# Patient Record
Sex: FEMALE | Race: WHITE | Employment: FULL TIME | URBAN - METROPOLITAN AREA
[De-identification: names, ages, dates, MRNs, and addresses within clinical notes are randomized per-mention and may not be internally consistent; named-entity substitution may affect disease eponyms.]

---

## 2018-01-15 ENCOUNTER — APPOINTMENT (OUTPATIENT)
Dept: CT IMAGING | Age: 38
End: 2018-01-15
Payer: COMMERCIAL

## 2018-01-15 ENCOUNTER — HOSPITAL ENCOUNTER (EMERGENCY)
Age: 38
Discharge: HOME | End: 2018-01-15
Payer: COMMERCIAL

## 2018-01-15 VITALS
TEMPERATURE: 97.8 F | HEART RATE: 74 BPM | BODY MASS INDEX: 32.96 KG/M2 | SYSTOLIC BLOOD PRESSURE: 124 MMHG | RESPIRATION RATE: 15 BRPM | OXYGEN SATURATION: 100 % | DIASTOLIC BLOOD PRESSURE: 78 MMHG | WEIGHT: 210 LBS | HEIGHT: 67 IN

## 2018-01-15 DIAGNOSIS — T81.31XA DEHISCENCE OF OPERATIVE WOUND, INITIAL ENCOUNTER: Primary | ICD-10-CM

## 2018-01-15 LAB
ALBUMIN SERPL-MCNC: 4.2 G/DL (ref 3.9–4.9)
ALP BLD-CCNC: 89 U/L (ref 40–130)
ALT SERPL-CCNC: 10 U/L (ref 0–33)
ANION GAP SERPL CALCULATED.3IONS-SCNC: 12 MEQ/L (ref 7–13)
ANISOCYTOSIS: 0
AST SERPL-CCNC: 12 U/L (ref 0–35)
BASOPHILS ABSOLUTE: 0 K/UL (ref 0–0.2)
BASOPHILS RELATIVE PERCENT: 1.1 %
BILIRUB SERPL-MCNC: 0.1 MG/DL (ref 0–1.2)
BILIRUBIN URINE: NEGATIVE
BLOOD, URINE: NEGATIVE
BUN BLDV-MCNC: 16 MG/DL (ref 6–20)
CALCIUM SERPL-MCNC: 9 MG/DL (ref 8.6–10.2)
CHLORIDE BLD-SCNC: 102 MEQ/L (ref 98–107)
CLARITY: CLEAR
CO2: 26 MEQ/L (ref 22–29)
COLOR: YELLOW
CREAT SERPL-MCNC: 0.94 MG/DL (ref 0.5–0.9)
EOSINOPHILS ABSOLUTE: 0.1 K/UL (ref 0–0.7)
EOSINOPHILS RELATIVE PERCENT: 1.7 %
GFR AFRICAN AMERICAN: >60
GFR NON-AFRICAN AMERICAN: >60
GLOBULIN: 2.8 G/DL (ref 2.3–3.5)
GLUCOSE BLD-MCNC: 98 MG/DL (ref 74–109)
GLUCOSE URINE: NEGATIVE MG/DL
HCT VFR BLD CALC: 38.5 % (ref 37–47)
HEMOGLOBIN: 12.7 G/DL (ref 12–16)
HYPOCHROMIA: 0
KETONES, URINE: NEGATIVE MG/DL
LEUKOCYTE ESTERASE, URINE: NEGATIVE
LYMPHOCYTES ABSOLUTE: 1.4 K/UL (ref 1–4.8)
LYMPHOCYTES RELATIVE PERCENT: 31.2 %
MACROCYTES: 0
MCH RBC QN AUTO: 26.9 PG (ref 27–31.3)
MCHC RBC AUTO-ENTMCNC: 33 % (ref 33–37)
MCV RBC AUTO: 81.6 FL (ref 82–100)
MICROCYTES: 0
MONOCYTES ABSOLUTE: 0.3 K/UL (ref 0.2–0.8)
MONOCYTES RELATIVE PERCENT: 7.7 %
NEUTROPHILS ABSOLUTE: 2.6 K/UL (ref 1.4–6.5)
NEUTROPHILS RELATIVE PERCENT: 58.3 %
NITRITE, URINE: NEGATIVE
PDW BLD-RTO: 13.9 % (ref 11.5–14.5)
PH UA: 6.5 (ref 5–9)
PLATELET # BLD: 190 K/UL (ref 130–400)
POC CREATININE WHOLE BLOOD: 1.2
POIKILOCYTES: 0
POLYCHROMASIA: 0
POTASSIUM SERPL-SCNC: 4.1 MEQ/L (ref 3.5–5.1)
PROTEIN UA: NEGATIVE MG/DL
RBC # BLD: 4.71 M/UL (ref 4.2–5.4)
SODIUM BLD-SCNC: 140 MEQ/L (ref 132–144)
SPECIFIC GRAVITY UA: 1.02 (ref 1–1.03)
TOTAL PROTEIN: 7 G/DL (ref 6.4–8.1)
URINE REFLEX TO CULTURE: NORMAL
UROBILINOGEN, URINE: 0.2 E.U./DL
WBC # BLD: 4.4 K/UL (ref 4.8–10.8)

## 2018-01-15 PROCEDURE — 80053 COMPREHEN METABOLIC PANEL: CPT

## 2018-01-15 PROCEDURE — 36415 COLL VENOUS BLD VENIPUNCTURE: CPT

## 2018-01-15 PROCEDURE — 99284 EMERGENCY DEPT VISIT MOD MDM: CPT

## 2018-01-15 PROCEDURE — 96372 THER/PROPH/DIAG INJ SC/IM: CPT

## 2018-01-15 PROCEDURE — 74176 CT ABD & PELVIS W/O CONTRAST: CPT

## 2018-01-15 PROCEDURE — 81003 URINALYSIS AUTO W/O SCOPE: CPT

## 2018-01-15 PROCEDURE — 85025 COMPLETE CBC W/AUTO DIFF WBC: CPT

## 2018-01-15 PROCEDURE — 6360000002 HC RX W HCPCS: Performed by: PERSONAL EMERGENCY RESPONSE ATTENDANT

## 2018-01-15 RX ORDER — KETOROLAC TROMETHAMINE 30 MG/ML
30 INJECTION, SOLUTION INTRAMUSCULAR; INTRAVENOUS ONCE
Status: DISCONTINUED | OUTPATIENT
Start: 2018-01-15 | End: 2018-01-15

## 2018-01-15 RX ORDER — AMOXICILLIN AND CLAVULANATE POTASSIUM 875; 125 MG/1; MG/1
1 TABLET, FILM COATED ORAL 2 TIMES DAILY
Qty: 14 TABLET | Refills: 0 | Status: SHIPPED | OUTPATIENT
Start: 2018-01-15 | End: 2018-01-22

## 2018-01-15 RX ORDER — KETOROLAC TROMETHAMINE 30 MG/ML
60 INJECTION, SOLUTION INTRAMUSCULAR; INTRAVENOUS ONCE
Status: COMPLETED | OUTPATIENT
Start: 2018-01-15 | End: 2018-01-15

## 2018-01-15 RX ADMIN — KETOROLAC TROMETHAMINE 60 MG: 30 INJECTION, SOLUTION INTRAMUSCULAR at 19:46

## 2018-01-15 ASSESSMENT — PAIN DESCRIPTION - DESCRIPTORS
DESCRIPTORS: PRESSURE
DESCRIPTORS: PRESSURE

## 2018-01-15 ASSESSMENT — ENCOUNTER SYMPTOMS
ABDOMINAL PAIN: 1
RHINORRHEA: 0
COUGH: 0
COLOR CHANGE: 0
SHORTNESS OF BREATH: 0
DIARRHEA: 0
VOMITING: 0
NAUSEA: 0
BLOOD IN STOOL: 0

## 2018-01-15 ASSESSMENT — PAIN DESCRIPTION - LOCATION
LOCATION: ABDOMEN
LOCATION: ABDOMEN

## 2018-01-15 ASSESSMENT — PAIN DESCRIPTION - ORIENTATION
ORIENTATION: LOWER
ORIENTATION: LOWER

## 2018-01-15 ASSESSMENT — PAIN DESCRIPTION - PAIN TYPE
TYPE: ACUTE PAIN
TYPE: ACUTE PAIN

## 2018-01-15 ASSESSMENT — PAIN SCALES - GENERAL
PAINLEVEL_OUTOF10: 2
PAINLEVEL_OUTOF10: 5
PAINLEVEL_OUTOF10: 5

## 2018-01-15 ASSESSMENT — PAIN DESCRIPTION - FREQUENCY
FREQUENCY: CONTINUOUS
FREQUENCY: CONTINUOUS

## 2018-01-15 ASSESSMENT — PAIN DESCRIPTION - PROGRESSION: CLINICAL_PROGRESSION: GRADUALLY IMPROVING

## 2018-01-15 NOTE — ED PROVIDER NOTES
abdomen reveals no acute process. Blood work is unremarkable. Urine shows no infection. Patient is laying comfortably in cart smiling in no apparent distress. Abdominal exam reveals a pinpoint area of wound dehiscence with no drainage. Patient placed on Augmentin. She was informed to wear belly band at home and follow up with OB. Patient appears nontoxic. Standard anticipatory guidance given to patient upon discharge. Have given them a specific time frame in which to follow-up and who to follow-up with. I have also advised them that they should return to the emergency department if they get worse, or not getting better or develop any new or concerning symptoms. Patient demonstrates understanding. CRITICAL CARE TIME   Total Critical Care time was 0 minutes, excluding separately reportable procedures. There was a high probability of clinically significant/life threatening deterioration in the patient's condition which required my urgent intervention. Procedures    FINAL IMPRESSION      1. Dehiscence of operative wound, initial encounter          DISPOSITION/PLAN   DISPOSITION Decision To Discharge 01/15/2018 08:59:02 PM      PATIENT REFERRED TO:  Follow up with your OB in 3 days            DISCHARGE MEDICATIONS:  New Prescriptions    AMOXICILLIN-CLAVULANATE (AUGMENTIN) 875-125 MG PER TABLET    Take 1 tablet by mouth 2 times daily for 7 days          (Please note that portions of this note were completed with a voice recognition program.  Efforts were made to edit the dictations but occasionally words are mis-transcribed. )    DEION Booker (electronically signed)  Emergency Physician [address] [place] [address]  01/15/18 [id number]

## 2018-01-16 LAB
GFR AFRICAN AMERICAN: >60
GFR NON-AFRICAN AMERICAN: 50
PERFORMED ON: ABNORMAL
POC CREATININE: 1.2 MG/DL (ref 0.6–1.1)
POC SAMPLE TYPE: ABNORMAL

## 2018-01-27 ENCOUNTER — APPOINTMENT (OUTPATIENT)
Dept: GENERAL RADIOLOGY | Age: 38
End: 2018-01-27
Payer: COMMERCIAL

## 2018-01-27 ENCOUNTER — HOSPITAL ENCOUNTER (EMERGENCY)
Age: 38
Discharge: HOME | End: 2018-01-27
Payer: COMMERCIAL

## 2018-01-27 VITALS
TEMPERATURE: 98 F | HEART RATE: 78 BPM | RESPIRATION RATE: 16 BRPM | BODY MASS INDEX: 32.33 KG/M2 | DIASTOLIC BLOOD PRESSURE: 74 MMHG | HEIGHT: 67 IN | SYSTOLIC BLOOD PRESSURE: 108 MMHG | WEIGHT: 206 LBS

## 2018-01-27 DIAGNOSIS — S33.5XXA LUMBAR SPRAIN, INITIAL ENCOUNTER: ICD-10-CM

## 2018-01-27 DIAGNOSIS — S92.901A CLOSED FRACTURE OF RIGHT FOOT, INITIAL ENCOUNTER: Primary | ICD-10-CM

## 2018-01-27 DIAGNOSIS — S93.401A SPRAIN OF RIGHT ANKLE, UNSPECIFIED LIGAMENT, INITIAL ENCOUNTER: ICD-10-CM

## 2018-01-27 PROCEDURE — 29515 APPLICATION SHORT LEG SPLINT: CPT

## 2018-01-27 PROCEDURE — 73630 X-RAY EXAM OF FOOT: CPT

## 2018-01-27 PROCEDURE — 72110 X-RAY EXAM L-2 SPINE 4/>VWS: CPT

## 2018-01-27 PROCEDURE — 99283 EMERGENCY DEPT VISIT LOW MDM: CPT

## 2018-01-27 PROCEDURE — 73610 X-RAY EXAM OF ANKLE: CPT

## 2018-01-27 PROCEDURE — 6370000000 HC RX 637 (ALT 250 FOR IP): Performed by: PHYSICIAN ASSISTANT

## 2018-01-27 RX ORDER — HYDROCODONE BITARTRATE AND ACETAMINOPHEN 5; 325 MG/1; MG/1
1 TABLET ORAL EVERY 6 HOURS PRN
Qty: 12 TABLET | Refills: 0 | Status: SHIPPED | OUTPATIENT
Start: 2018-01-27 | End: 2018-01-30

## 2018-01-27 RX ORDER — HYDROCODONE BITARTRATE AND ACETAMINOPHEN 5; 325 MG/1; MG/1
1 TABLET ORAL ONCE
Status: COMPLETED | OUTPATIENT
Start: 2018-01-27 | End: 2018-01-27

## 2018-01-27 RX ORDER — IBUPROFEN 800 MG/1
800 TABLET ORAL EVERY 8 HOURS PRN
Qty: 12 TABLET | Refills: 0 | Status: SHIPPED | OUTPATIENT
Start: 2018-01-27 | End: 2018-04-22

## 2018-01-27 RX ADMIN — HYDROCODONE BITARTRATE AND ACETAMINOPHEN 1 TABLET: 5; 325 TABLET ORAL at 11:19

## 2018-01-27 ASSESSMENT — PAIN DESCRIPTION - LOCATION: LOCATION: BACK;FOOT

## 2018-01-27 ASSESSMENT — ENCOUNTER SYMPTOMS
GASTROINTESTINAL NEGATIVE: 1
BACK PAIN: 1
RESPIRATORY NEGATIVE: 1
EYES NEGATIVE: 1

## 2018-01-27 ASSESSMENT — PAIN DESCRIPTION - PAIN TYPE: TYPE: ACUTE PAIN

## 2018-01-27 ASSESSMENT — PAIN DESCRIPTION - ORIENTATION: ORIENTATION: RIGHT;LOWER

## 2018-01-27 ASSESSMENT — PAIN SCALES - GENERAL
PAINLEVEL_OUTOF10: 6
PAINLEVEL_OUTOF10: 8

## 2018-01-27 NOTE — ED TRIAGE NOTES
Pt a/o x 3 skin pink w/d resp nonlabored. Pt states missed step and went down 4 steps. Pt c/o low back pain and rt top of foot hurting . Pt ambulates with limp. o/e small lump noted to top of foot w/o deformity.

## 2018-03-12 ENCOUNTER — APPOINTMENT (OUTPATIENT)
Dept: CT IMAGING | Age: 38
End: 2018-03-12

## 2018-03-12 ENCOUNTER — HOSPITAL ENCOUNTER (EMERGENCY)
Age: 38
Discharge: HOME | End: 2018-03-12

## 2018-03-12 ENCOUNTER — APPOINTMENT (OUTPATIENT)
Dept: GENERAL RADIOLOGY | Age: 38
End: 2018-03-12

## 2018-03-12 VITALS
WEIGHT: 216 LBS | HEART RATE: 61 BPM | RESPIRATION RATE: 16 BRPM | OXYGEN SATURATION: 100 % | HEIGHT: 67 IN | SYSTOLIC BLOOD PRESSURE: 115 MMHG | DIASTOLIC BLOOD PRESSURE: 72 MMHG | TEMPERATURE: 98.2 F | BODY MASS INDEX: 33.9 KG/M2

## 2018-03-12 DIAGNOSIS — R04.2 COUGH WITH HEMOPTYSIS: Primary | ICD-10-CM

## 2018-03-12 LAB
ALBUMIN SERPL-MCNC: 4.4 G/DL (ref 3.9–4.9)
ALP BLD-CCNC: 77 U/L (ref 40–130)
ALT SERPL-CCNC: 13 U/L (ref 0–33)
ANION GAP SERPL CALCULATED.3IONS-SCNC: 15 MEQ/L (ref 7–13)
APTT: 26.8 SEC (ref 21.6–35.4)
AST SERPL-CCNC: 15 U/L (ref 0–35)
BILIRUB SERPL-MCNC: 0.2 MG/DL (ref 0–1.2)
BUN BLDV-MCNC: 12 MG/DL (ref 6–20)
CALCIUM SERPL-MCNC: 9.3 MG/DL (ref 8.6–10.2)
CHLORIDE BLD-SCNC: 98 MEQ/L (ref 98–107)
CO2: 24 MEQ/L (ref 22–29)
CREAT SERPL-MCNC: 0.71 MG/DL (ref 0.5–0.9)
GFR AFRICAN AMERICAN: >60
GFR NON-AFRICAN AMERICAN: >60
GLOBULIN: 2.7 G/DL (ref 2.3–3.5)
GLUCOSE BLD-MCNC: 92 MG/DL (ref 74–109)
HCT VFR BLD CALC: 40.7 % (ref 37–47)
HEMOGLOBIN: 13 G/DL (ref 12–16)
INR BLD: 1
MCH RBC QN AUTO: 25.5 PG (ref 27–31.3)
MCHC RBC AUTO-ENTMCNC: 31.9 % (ref 33–37)
MCV RBC AUTO: 80 FL (ref 82–100)
PDW BLD-RTO: 16.1 % (ref 11.5–14.5)
PLATELET # BLD: 178 K/UL (ref 130–400)
POTASSIUM SERPL-SCNC: 3.8 MEQ/L (ref 3.5–5.1)
PROTHROMBIN TIME: 10.5 SEC (ref 8.1–13.7)
RBC # BLD: 5.08 M/UL (ref 4.2–5.4)
SODIUM BLD-SCNC: 137 MEQ/L (ref 132–144)
TOTAL PROTEIN: 7.1 G/DL (ref 6.4–8.1)
WBC # BLD: 4.3 K/UL (ref 4.8–10.8)

## 2018-03-12 PROCEDURE — 80053 COMPREHEN METABOLIC PANEL: CPT

## 2018-03-12 PROCEDURE — 99284 EMERGENCY DEPT VISIT MOD MDM: CPT

## 2018-03-12 PROCEDURE — 85027 COMPLETE CBC AUTOMATED: CPT

## 2018-03-12 PROCEDURE — 36415 COLL VENOUS BLD VENIPUNCTURE: CPT

## 2018-03-12 PROCEDURE — 2580000003 HC RX 258: Performed by: PHYSICIAN ASSISTANT

## 2018-03-12 PROCEDURE — 6360000004 HC RX CONTRAST MEDICATION: Performed by: PHYSICIAN ASSISTANT

## 2018-03-12 PROCEDURE — 71046 X-RAY EXAM CHEST 2 VIEWS: CPT

## 2018-03-12 PROCEDURE — 85730 THROMBOPLASTIN TIME PARTIAL: CPT

## 2018-03-12 PROCEDURE — 71275 CT ANGIOGRAPHY CHEST: CPT

## 2018-03-12 PROCEDURE — 85610 PROTHROMBIN TIME: CPT

## 2018-03-12 RX ORDER — PANTOPRAZOLE SODIUM 40 MG/1
40 GRANULE, DELAYED RELEASE ORAL
COMMUNITY
End: 2018-04-22

## 2018-03-12 RX ORDER — SODIUM CHLORIDE 0.9 % (FLUSH) 0.9 %
10 SYRINGE (ML) INJECTION ONCE
Status: COMPLETED | OUTPATIENT
Start: 2018-03-12 | End: 2018-03-12

## 2018-03-12 RX ORDER — TIZANIDINE 4 MG/1
4 TABLET ORAL EVERY 6 HOURS PRN
COMMUNITY
End: 2018-04-22

## 2018-03-12 RX ORDER — CITALOPRAM 40 MG/1
40 TABLET ORAL DAILY
COMMUNITY

## 2018-03-12 RX ORDER — AZITHROMYCIN 250 MG/1
TABLET, FILM COATED ORAL
Qty: 1 PACKET | Refills: 0 | Status: SHIPPED | OUTPATIENT
Start: 2018-03-12 | End: 2018-03-22

## 2018-03-12 RX ORDER — PROMETHAZINE HYDROCHLORIDE 25 MG/1
25 TABLET ORAL EVERY 6 HOURS PRN
COMMUNITY
End: 2018-04-22

## 2018-03-12 RX ORDER — LEVOTHYROXINE SODIUM 0.15 MG/1
150 TABLET ORAL DAILY
COMMUNITY

## 2018-03-12 RX ORDER — BENZONATATE 100 MG/1
100 CAPSULE ORAL 3 TIMES DAILY PRN
Qty: 21 CAPSULE | Refills: 0 | Status: SHIPPED | OUTPATIENT
Start: 2018-03-12 | End: 2018-04-22

## 2018-03-12 RX ORDER — BUTALBITAL, ACETAMINOPHEN AND CAFFEINE 50; 325; 40 MG/1; MG/1; MG/1
1 TABLET ORAL EVERY 4 HOURS PRN
COMMUNITY
End: 2018-04-07 | Stop reason: SDUPTHER

## 2018-03-12 RX ADMIN — SODIUM CHLORIDE, PRESERVATIVE FREE 10 ML: 5 INJECTION INTRAVENOUS at 13:02

## 2018-03-12 RX ADMIN — IOPAMIDOL 100 ML: 755 INJECTION, SOLUTION INTRAVENOUS at 13:01

## 2018-03-12 ASSESSMENT — ENCOUNTER SYMPTOMS
NAUSEA: 0
STRIDOR: 0
DIARRHEA: 0
ABDOMINAL DISTENTION: 0
ABDOMINAL PAIN: 0
SORE THROAT: 0
COUGH: 1
RHINORRHEA: 0
EYE DISCHARGE: 0
COLOR CHANGE: 0
SHORTNESS OF BREATH: 0
WHEEZING: 0
BACK PAIN: 0
CHOKING: 0
CONSTIPATION: 0
VOMITING: 0

## 2018-03-12 ASSESSMENT — PAIN SCALES - GENERAL
PAINLEVEL_OUTOF10: 2
PAINLEVEL_OUTOF10: 2

## 2018-03-12 ASSESSMENT — PAIN DESCRIPTION - LOCATION: LOCATION: ABDOMEN

## 2018-03-12 ASSESSMENT — PAIN DESCRIPTION - PAIN TYPE
TYPE: ACUTE PAIN
TYPE: ACUTE PAIN

## 2018-03-12 ASSESSMENT — PAIN DESCRIPTION - FREQUENCY
FREQUENCY: CONTINUOUS
FREQUENCY: CONTINUOUS

## 2018-03-12 ASSESSMENT — PAIN DESCRIPTION - DESCRIPTORS
DESCRIPTORS: SORE
DESCRIPTORS: DULL;ACHING

## 2018-03-12 NOTE — ED PROVIDER NOTES
which required my urgent intervention. CONSULTS:  None    PROCEDURES:  Unless otherwise noted below, none     Procedures    FINAL IMPRESSION      1. Cough with hemoptysis          DISPOSITION/PLAN   DISPOSITION Decision To Discharge 03/12/2018 02:06:46 PM      PATIENT REFERRED TO:  Arin Cheatham DO  [address] [facility], [place]  [address]  [phone]    In 3 days        DISCHARGE MEDICATIONS:  New Prescriptions    AZITHROMYCIN (ZITHROMAX) 250 MG TABLET    Take 2 tablets (500 mg) on Day 1, followed by 1 tablet (250 mg) once daily on Days 2 through 5.     BENZONATATE (TESSALON PERLES) 100 MG CAPSULE    Take 1 capsule by mouth 3 times daily as needed for Cough          (Please note that portions of this note were completed with a voice recognition program.  Efforts were made to edit the dictations but occasionally words are mis-transcribed.)    Mely Horn PA-C (electronically signed)  Attending Emergency Physician         Mely Horn PA-C  03/12/18 [id number]

## 2018-03-13 LAB
GFR AFRICAN AMERICAN: >60
GFR NON-AFRICAN AMERICAN: >60
PERFORMED ON: NORMAL
POC CREATININE: 0.6 MG/DL (ref 0.6–1.1)
POC SAMPLE TYPE: NORMAL

## 2018-04-06 ENCOUNTER — HOSPITAL ENCOUNTER (EMERGENCY)
Age: 38
Discharge: HOME | End: 2018-04-07
Payer: COMMERCIAL

## 2018-04-06 ENCOUNTER — APPOINTMENT (OUTPATIENT)
Dept: CT IMAGING | Age: 38
End: 2018-04-06

## 2018-04-06 DIAGNOSIS — J40 BRONCHITIS: Primary | ICD-10-CM

## 2018-04-06 DIAGNOSIS — R51.9 NONINTRACTABLE HEADACHE, UNSPECIFIED CHRONICITY PATTERN, UNSPECIFIED HEADACHE TYPE: ICD-10-CM

## 2018-04-06 LAB
EKG ATRIAL RATE: 67 BPM
EKG P AXIS: 29 DEGREES
EKG P-R INTERVAL: 130 MS
EKG Q-T INTERVAL: 436 MS
EKG QRS DURATION: 76 MS
EKG QTC CALCULATION (BAZETT): 460 MS
EKG R AXIS: -8 DEGREES
EKG T AXIS: 16 DEGREES
EKG VENTRICULAR RATE: 67 BPM

## 2018-04-06 PROCEDURE — 36415 COLL VENOUS BLD VENIPUNCTURE: CPT

## 2018-04-06 PROCEDURE — 85025 COMPLETE CBC W/AUTO DIFF WBC: CPT

## 2018-04-06 PROCEDURE — 6360000002 HC RX W HCPCS: Performed by: PHYSICIAN ASSISTANT

## 2018-04-06 PROCEDURE — 84484 ASSAY OF TROPONIN QUANT: CPT

## 2018-04-06 PROCEDURE — 80048 BASIC METABOLIC PNL TOTAL CA: CPT

## 2018-04-06 PROCEDURE — 93005 ELECTROCARDIOGRAM TRACING: CPT

## 2018-04-06 PROCEDURE — 70450 CT HEAD/BRAIN W/O DYE: CPT

## 2018-04-06 PROCEDURE — 99284 EMERGENCY DEPT VISIT MOD MDM: CPT

## 2018-04-06 RX ORDER — ALBUTEROL SULFATE 2.5 MG/3ML
2.5 SOLUTION RESPIRATORY (INHALATION) EVERY 10 MIN PRN
Status: DISCONTINUED | OUTPATIENT
Start: 2018-04-06 | End: 2018-04-07 | Stop reason: HOSPADM

## 2018-04-06 RX ADMIN — ALBUTEROL SULFATE 2.5 MG: 2.5 SOLUTION RESPIRATORY (INHALATION) at 23:58

## 2018-04-06 ASSESSMENT — PAIN DESCRIPTION - PAIN TYPE: TYPE: ACUTE PAIN

## 2018-04-06 ASSESSMENT — PAIN DESCRIPTION - LOCATION: LOCATION: CHEST;HEAD;THROAT

## 2018-04-06 ASSESSMENT — PAIN SCALES - GENERAL: PAINLEVEL_OUTOF10: 8

## 2018-04-07 ENCOUNTER — APPOINTMENT (OUTPATIENT)
Dept: GENERAL RADIOLOGY | Age: 38
End: 2018-04-07

## 2018-04-07 VITALS
HEIGHT: 67 IN | HEART RATE: 98 BPM | BODY MASS INDEX: 33.74 KG/M2 | DIASTOLIC BLOOD PRESSURE: 57 MMHG | SYSTOLIC BLOOD PRESSURE: 98 MMHG | OXYGEN SATURATION: 98 % | WEIGHT: 215 LBS | RESPIRATION RATE: 18 BRPM | TEMPERATURE: 98.7 F

## 2018-04-07 LAB
ANION GAP SERPL CALCULATED.3IONS-SCNC: 12 MEQ/L (ref 7–13)
BASOPHILS ABSOLUTE: 0 K/UL (ref 0–0.2)
BASOPHILS RELATIVE PERCENT: 0.9 %
BUN BLDV-MCNC: 16 MG/DL (ref 6–20)
CALCIUM SERPL-MCNC: 8.8 MG/DL (ref 8.6–10.2)
CHLORIDE BLD-SCNC: 103 MEQ/L (ref 98–107)
CO2: 26 MEQ/L (ref 22–29)
CREAT SERPL-MCNC: 0.63 MG/DL (ref 0.5–0.9)
EOSINOPHILS ABSOLUTE: 0.1 K/UL (ref 0–0.7)
EOSINOPHILS RELATIVE PERCENT: 2.1 %
GFR AFRICAN AMERICAN: >60
GFR NON-AFRICAN AMERICAN: >60
GLUCOSE BLD-MCNC: 111 MG/DL (ref 74–109)
HCT VFR BLD CALC: 39.5 % (ref 37–47)
HEMOGLOBIN: 13 G/DL (ref 12–16)
LYMPHOCYTES ABSOLUTE: 1.1 K/UL (ref 1–4.8)
LYMPHOCYTES RELATIVE PERCENT: 26.9 %
MCH RBC QN AUTO: 25.5 PG (ref 27–31.3)
MCHC RBC AUTO-ENTMCNC: 32.9 % (ref 33–37)
MCV RBC AUTO: 77.6 FL (ref 82–100)
MONOCYTES ABSOLUTE: 0.5 K/UL (ref 0.2–0.8)
MONOCYTES RELATIVE PERCENT: 11.5 %
NEUTROPHILS ABSOLUTE: 2.4 K/UL (ref 1.4–6.5)
NEUTROPHILS RELATIVE PERCENT: 58.6 %
PDW BLD-RTO: 16.6 % (ref 11.5–14.5)
PLATELET # BLD: 149 K/UL (ref 130–400)
POTASSIUM SERPL-SCNC: 4 MEQ/L (ref 3.5–5.1)
RAPID INFLUENZA  B AGN: NEGATIVE
RAPID INFLUENZA A AGN: NEGATIVE
RBC # BLD: 5.09 M/UL (ref 4.2–5.4)
SODIUM BLD-SCNC: 141 MEQ/L (ref 132–144)
TROPONIN: <0.01 NG/ML (ref 0–0.01)
WBC # BLD: 4.2 K/UL (ref 4.8–10.8)

## 2018-04-07 PROCEDURE — 6360000002 HC RX W HCPCS: Performed by: PHYSICIAN ASSISTANT

## 2018-04-07 PROCEDURE — 86403 PARTICLE AGGLUT ANTBDY SCRN: CPT

## 2018-04-07 PROCEDURE — 71046 X-RAY EXAM CHEST 2 VIEWS: CPT

## 2018-04-07 PROCEDURE — 96375 TX/PRO/DX INJ NEW DRUG ADDON: CPT

## 2018-04-07 PROCEDURE — 96374 THER/PROPH/DIAG INJ IV PUSH: CPT

## 2018-04-07 PROCEDURE — 94760 N-INVAS EAR/PLS OXIMETRY 1: CPT

## 2018-04-07 PROCEDURE — 94640 AIRWAY INHALATION TREATMENT: CPT

## 2018-04-07 PROCEDURE — 6370000000 HC RX 637 (ALT 250 FOR IP): Performed by: PHYSICIAN ASSISTANT

## 2018-04-07 RX ORDER — BUTALBITAL, ACETAMINOPHEN AND CAFFEINE 50; 325; 40 MG/1; MG/1; MG/1
1 TABLET ORAL EVERY 4 HOURS PRN
Qty: 15 TABLET | Refills: 0 | Status: SHIPPED | OUTPATIENT
Start: 2018-04-07

## 2018-04-07 RX ORDER — ALBUTEROL SULFATE 90 UG/1
AEROSOL, METERED RESPIRATORY (INHALATION)
Qty: 1 INHALER | Refills: 1 | Status: SHIPPED | OUTPATIENT
Start: 2018-04-07 | End: 2018-04-22

## 2018-04-07 RX ORDER — ACETAMINOPHEN 500 MG
1000 TABLET ORAL ONCE
Status: COMPLETED | OUTPATIENT
Start: 2018-04-07 | End: 2018-04-07

## 2018-04-07 RX ORDER — MORPHINE SULFATE 4 MG/ML
4 INJECTION, SOLUTION INTRAMUSCULAR; INTRAVENOUS ONCE
Status: COMPLETED | OUTPATIENT
Start: 2018-04-07 | End: 2018-04-07

## 2018-04-07 RX ORDER — ONDANSETRON 2 MG/ML
4 INJECTION INTRAMUSCULAR; INTRAVENOUS ONCE
Status: COMPLETED | OUTPATIENT
Start: 2018-04-07 | End: 2018-04-07

## 2018-04-07 RX ORDER — AZITHROMYCIN 250 MG/1
TABLET, FILM COATED ORAL
Qty: 6 TABLET | Refills: 0 | Status: SHIPPED | OUTPATIENT
Start: 2018-04-07 | End: 2018-04-17

## 2018-04-07 RX ADMIN — ALBUTEROL SULFATE 2.5 MG: 2.5 SOLUTION RESPIRATORY (INHALATION) at 00:03

## 2018-04-07 RX ADMIN — MORPHINE SULFATE 4 MG: 4 INJECTION, SOLUTION INTRAMUSCULAR; INTRAVENOUS at 01:27

## 2018-04-07 RX ADMIN — ONDANSETRON 4 MG: 2 INJECTION INTRAMUSCULAR; INTRAVENOUS at 01:27

## 2018-04-07 RX ADMIN — ACETAMINOPHEN 1000 MG: 500 TABLET, FILM COATED ORAL at 02:31

## 2018-04-07 ASSESSMENT — PAIN SCALES - GENERAL: PAINLEVEL_OUTOF10: 5

## 2018-04-07 ASSESSMENT — ENCOUNTER SYMPTOMS
VOICE CHANGE: 0
ANAL BLEEDING: 0
COUGH: 1
EYE DISCHARGE: 0
APNEA: 0
PHOTOPHOBIA: 0
ABDOMINAL DISTENTION: 0

## 2018-04-09 PROCEDURE — 93010 ELECTROCARDIOGRAM REPORT: CPT | Performed by: INTERNAL MEDICINE

## 2018-04-22 ENCOUNTER — HOSPITAL ENCOUNTER (EMERGENCY)
Age: 38
Discharge: HOME | End: 2018-04-22

## 2018-04-22 VITALS
SYSTOLIC BLOOD PRESSURE: 134 MMHG | RESPIRATION RATE: 20 BRPM | WEIGHT: 220 LBS | HEART RATE: 82 BPM | BODY MASS INDEX: 34.53 KG/M2 | HEIGHT: 67 IN | TEMPERATURE: 98.3 F | OXYGEN SATURATION: 99 % | DIASTOLIC BLOOD PRESSURE: 85 MMHG

## 2018-04-22 DIAGNOSIS — M54.41 ACUTE RIGHT-SIDED LOW BACK PAIN WITH RIGHT-SIDED SCIATICA: Primary | ICD-10-CM

## 2018-04-22 PROCEDURE — 6370000000 HC RX 637 (ALT 250 FOR IP): Performed by: PERSONAL EMERGENCY RESPONSE ATTENDANT

## 2018-04-22 PROCEDURE — 96372 THER/PROPH/DIAG INJ SC/IM: CPT

## 2018-04-22 PROCEDURE — 99283 EMERGENCY DEPT VISIT LOW MDM: CPT

## 2018-04-22 PROCEDURE — 6360000002 HC RX W HCPCS: Performed by: PERSONAL EMERGENCY RESPONSE ATTENDANT

## 2018-04-22 RX ORDER — CYCLOBENZAPRINE HCL 10 MG
10 TABLET ORAL ONCE
Status: COMPLETED | OUTPATIENT
Start: 2018-04-22 | End: 2018-04-22

## 2018-04-22 RX ORDER — METHYLPREDNISOLONE 4 MG/1
TABLET ORAL
Qty: 1 KIT | Refills: 0 | Status: SHIPPED | OUTPATIENT
Start: 2018-04-22 | End: 2018-06-14 | Stop reason: ALTCHOICE

## 2018-04-22 RX ORDER — ORPHENADRINE CITRATE 30 MG/ML
60 INJECTION INTRAMUSCULAR; INTRAVENOUS ONCE
Status: DISCONTINUED | OUTPATIENT
Start: 2018-04-22 | End: 2018-04-22

## 2018-04-22 RX ORDER — KETOROLAC TROMETHAMINE 30 MG/ML
60 INJECTION, SOLUTION INTRAMUSCULAR; INTRAVENOUS ONCE
Status: COMPLETED | OUTPATIENT
Start: 2018-04-22 | End: 2018-04-22

## 2018-04-22 RX ORDER — NAPROXEN 500 MG/1
500 TABLET ORAL 2 TIMES DAILY WITH MEALS
Qty: 30 TABLET | Refills: 0 | Status: SHIPPED | OUTPATIENT
Start: 2018-04-22 | End: 2018-06-14 | Stop reason: ALTCHOICE

## 2018-04-22 RX ORDER — CYCLOBENZAPRINE HCL 10 MG
10 TABLET ORAL 3 TIMES DAILY PRN
Qty: 6 TABLET | Refills: 0 | Status: SHIPPED | OUTPATIENT
Start: 2018-04-22 | End: 2018-05-02

## 2018-04-22 RX ORDER — HYDROCODONE BITARTRATE AND ACETAMINOPHEN 5; 325 MG/1; MG/1
1 TABLET ORAL ONCE
Status: COMPLETED | OUTPATIENT
Start: 2018-04-22 | End: 2018-04-22

## 2018-04-22 RX ADMIN — HYDROCODONE BITARTRATE AND ACETAMINOPHEN 1 TABLET: 5; 325 TABLET ORAL at 22:40

## 2018-04-22 RX ADMIN — KETOROLAC TROMETHAMINE 60 MG: 30 INJECTION, SOLUTION INTRAMUSCULAR at 22:40

## 2018-04-22 RX ADMIN — CYCLOBENZAPRINE HYDROCHLORIDE 10 MG: 10 TABLET, FILM COATED ORAL at 22:45

## 2018-04-22 ASSESSMENT — PAIN DESCRIPTION - ORIENTATION: ORIENTATION: MID

## 2018-04-22 ASSESSMENT — ENCOUNTER SYMPTOMS
COUGH: 0
NAUSEA: 0
VOMITING: 0
SHORTNESS OF BREATH: 0
BACK PAIN: 1
RHINORRHEA: 0
BLOOD IN STOOL: 0
COLOR CHANGE: 0
SORE THROAT: 0
DIARRHEA: 0
ABDOMINAL PAIN: 0

## 2018-04-22 ASSESSMENT — PAIN DESCRIPTION - DESCRIPTORS: DESCRIPTORS: CONSTANT;SHOOTING

## 2018-04-22 ASSESSMENT — PAIN DESCRIPTION - FREQUENCY: FREQUENCY: CONTINUOUS

## 2018-04-22 ASSESSMENT — PAIN SCALES - GENERAL
PAINLEVEL_OUTOF10: 9
PAINLEVEL_OUTOF10: 5

## 2018-04-22 ASSESSMENT — PAIN DESCRIPTION - PAIN TYPE: TYPE: ACUTE PAIN

## 2018-04-22 ASSESSMENT — PAIN DESCRIPTION - LOCATION: LOCATION: BACK

## 2018-05-17 ENCOUNTER — HOSPITAL ENCOUNTER (EMERGENCY)
Age: 38
Discharge: HOME | End: 2018-05-17
Attending: EMERGENCY MEDICINE

## 2018-05-17 VITALS
OXYGEN SATURATION: 99 % | WEIGHT: 220 LBS | HEIGHT: 67 IN | BODY MASS INDEX: 34.53 KG/M2 | TEMPERATURE: 98.1 F | DIASTOLIC BLOOD PRESSURE: 73 MMHG | HEART RATE: 90 BPM | SYSTOLIC BLOOD PRESSURE: 123 MMHG | RESPIRATION RATE: 18 BRPM

## 2018-05-17 DIAGNOSIS — J30.1 ACUTE SEASONAL ALLERGIC RHINITIS DUE TO POLLEN: ICD-10-CM

## 2018-05-17 DIAGNOSIS — J45.20 MILD INTERMITTENT REACTIVE AIRWAY DISEASE WITHOUT COMPLICATION: Primary | ICD-10-CM

## 2018-05-17 PROCEDURE — 99284 EMERGENCY DEPT VISIT MOD MDM: CPT

## 2018-05-17 RX ORDER — CEFUROXIME AXETIL 500 MG/1
500 TABLET ORAL 2 TIMES DAILY
Status: ON HOLD | COMMUNITY
End: 2018-11-12 | Stop reason: HOSPADM

## 2018-05-17 RX ORDER — FLUTICASONE PROPIONATE 110 UG/1
1 AEROSOL, METERED RESPIRATORY (INHALATION) 2 TIMES DAILY
Qty: 1 INHALER | Refills: 3 | Status: SHIPPED | OUTPATIENT
Start: 2018-05-17 | End: 2019-03-25 | Stop reason: ALTCHOICE

## 2018-05-17 ASSESSMENT — PAIN SCALES - GENERAL: PAINLEVEL_OUTOF10: 5

## 2018-05-17 ASSESSMENT — ENCOUNTER SYMPTOMS
ABDOMINAL PAIN: 0
NAUSEA: 0
SORE THROAT: 0
DIARRHEA: 1
BACK PAIN: 0
VOMITING: 0
COUGH: 1
SHORTNESS OF BREATH: 1

## 2018-05-17 ASSESSMENT — PAIN DESCRIPTION - LOCATION: LOCATION: CHEST

## 2018-06-05 ENCOUNTER — HOSPITAL ENCOUNTER (OUTPATIENT)
Dept: GENERAL RADIOLOGY | Age: 38
Discharge: HOME | End: 2018-06-07

## 2018-06-05 DIAGNOSIS — R05.9 COUGH: ICD-10-CM

## 2018-06-05 PROCEDURE — 71046 X-RAY EXAM CHEST 2 VIEWS: CPT

## 2018-06-14 ENCOUNTER — HOSPITAL ENCOUNTER (EMERGENCY)
Age: 38
Discharge: HOME | End: 2018-06-14
Payer: COMMERCIAL

## 2018-06-14 VITALS
SYSTOLIC BLOOD PRESSURE: 117 MMHG | DIASTOLIC BLOOD PRESSURE: 79 MMHG | HEART RATE: 76 BPM | WEIGHT: 220 LBS | HEIGHT: 66 IN | RESPIRATION RATE: 16 BRPM | BODY MASS INDEX: 35.36 KG/M2 | OXYGEN SATURATION: 98 % | TEMPERATURE: 98.2 F

## 2018-06-14 DIAGNOSIS — M43.6 TORTICOLLIS: Primary | ICD-10-CM

## 2018-06-14 PROCEDURE — 99282 EMERGENCY DEPT VISIT SF MDM: CPT

## 2018-06-14 PROCEDURE — 96372 THER/PROPH/DIAG INJ SC/IM: CPT

## 2018-06-14 PROCEDURE — 6360000002 HC RX W HCPCS: Performed by: PHYSICIAN ASSISTANT

## 2018-06-14 RX ORDER — MELOXICAM 7.5 MG/1
15 TABLET ORAL DAILY
Qty: 10 TABLET | Refills: 3 | Status: ON HOLD | OUTPATIENT
Start: 2018-06-14 | End: 2018-11-12 | Stop reason: HOSPADM

## 2018-06-14 RX ORDER — DIAZEPAM 5 MG/1
5 TABLET ORAL EVERY 8 HOURS PRN
Qty: 6 TABLET | Refills: 0 | Status: SHIPPED | OUTPATIENT
Start: 2018-06-14 | End: 2018-06-16

## 2018-06-14 RX ORDER — KETOROLAC TROMETHAMINE 30 MG/ML
60 INJECTION, SOLUTION INTRAMUSCULAR; INTRAVENOUS ONCE
Status: COMPLETED | OUTPATIENT
Start: 2018-06-14 | End: 2018-06-14

## 2018-06-14 RX ORDER — ORPHENADRINE CITRATE 30 MG/ML
60 INJECTION INTRAMUSCULAR; INTRAVENOUS ONCE
Status: COMPLETED | OUTPATIENT
Start: 2018-06-14 | End: 2018-06-14

## 2018-06-14 RX ORDER — CHLORZOXAZONE 500 MG/1
500 TABLET ORAL 3 TIMES DAILY PRN
Qty: 15 TABLET | Refills: 0 | Status: SHIPPED | OUTPATIENT
Start: 2018-06-14 | End: 2018-06-24

## 2018-06-14 RX ADMIN — KETOROLAC TROMETHAMINE 60 MG: 30 INJECTION, SOLUTION INTRAMUSCULAR at 13:36

## 2018-06-14 RX ADMIN — ORPHENADRINE CITRATE 60 MG: 30 INJECTION INTRAMUSCULAR; INTRAVENOUS at 13:36

## 2018-06-14 ASSESSMENT — PAIN DESCRIPTION - FREQUENCY: FREQUENCY: CONTINUOUS

## 2018-06-14 ASSESSMENT — PAIN DESCRIPTION - PROGRESSION: CLINICAL_PROGRESSION: GRADUALLY IMPROVING

## 2018-06-14 ASSESSMENT — PAIN DESCRIPTION - ONSET: ONSET: ON-GOING

## 2018-06-14 ASSESSMENT — ENCOUNTER SYMPTOMS
EYES NEGATIVE: 1
GASTROINTESTINAL NEGATIVE: 1
RESPIRATORY NEGATIVE: 1

## 2018-06-14 ASSESSMENT — PAIN DESCRIPTION - PAIN TYPE
TYPE: ACUTE PAIN
TYPE: ACUTE PAIN

## 2018-06-14 ASSESSMENT — PAIN SCALES - GENERAL
PAINLEVEL_OUTOF10: 8
PAINLEVEL_OUTOF10: 6
PAINLEVEL_OUTOF10: 8

## 2018-06-14 ASSESSMENT — PAIN DESCRIPTION - ORIENTATION: ORIENTATION: LEFT

## 2018-06-14 ASSESSMENT — PAIN DESCRIPTION - DESCRIPTORS: DESCRIPTORS: ACHING

## 2018-06-14 ASSESSMENT — PAIN DESCRIPTION - LOCATION: LOCATION: NECK

## 2018-06-18 ENCOUNTER — HOSPITAL ENCOUNTER (EMERGENCY)
Age: 38
Discharge: HOME | End: 2018-06-18
Attending: EMERGENCY MEDICINE

## 2018-06-18 VITALS
TEMPERATURE: 97.7 F | HEIGHT: 67 IN | DIASTOLIC BLOOD PRESSURE: 85 MMHG | SYSTOLIC BLOOD PRESSURE: 126 MMHG | WEIGHT: 210 LBS | OXYGEN SATURATION: 100 % | HEART RATE: 82 BPM | BODY MASS INDEX: 32.96 KG/M2 | RESPIRATION RATE: 20 BRPM

## 2018-06-18 DIAGNOSIS — G43.009 MIGRAINE WITHOUT AURA AND WITHOUT STATUS MIGRAINOSUS, NOT INTRACTABLE: Primary | ICD-10-CM

## 2018-06-18 PROCEDURE — 6360000002 HC RX W HCPCS: Performed by: EMERGENCY MEDICINE

## 2018-06-18 PROCEDURE — 99283 EMERGENCY DEPT VISIT LOW MDM: CPT

## 2018-06-18 PROCEDURE — 96375 TX/PRO/DX INJ NEW DRUG ADDON: CPT

## 2018-06-18 PROCEDURE — 96374 THER/PROPH/DIAG INJ IV PUSH: CPT

## 2018-06-18 RX ORDER — KETOROLAC TROMETHAMINE 30 MG/ML
30 INJECTION, SOLUTION INTRAMUSCULAR; INTRAVENOUS ONCE
Status: COMPLETED | OUTPATIENT
Start: 2018-06-18 | End: 2018-06-18

## 2018-06-18 RX ORDER — DIPHENHYDRAMINE HYDROCHLORIDE 50 MG/ML
25 INJECTION INTRAMUSCULAR; INTRAVENOUS ONCE
Status: COMPLETED | OUTPATIENT
Start: 2018-06-18 | End: 2018-06-18

## 2018-06-18 RX ORDER — METOCLOPRAMIDE HYDROCHLORIDE 5 MG/ML
10 INJECTION INTRAMUSCULAR; INTRAVENOUS ONCE
Status: COMPLETED | OUTPATIENT
Start: 2018-06-18 | End: 2018-06-18

## 2018-06-18 RX ADMIN — KETOROLAC TROMETHAMINE 30 MG: 30 INJECTION, SOLUTION INTRAMUSCULAR at 11:00

## 2018-06-18 RX ADMIN — METOCLOPRAMIDE 10 MG: 5 INJECTION, SOLUTION INTRAMUSCULAR; INTRAVENOUS at 11:01

## 2018-06-18 RX ADMIN — DIPHENHYDRAMINE HYDROCHLORIDE 25 MG: 50 INJECTION, SOLUTION INTRAMUSCULAR; INTRAVENOUS at 11:03

## 2018-06-18 ASSESSMENT — ENCOUNTER SYMPTOMS
SHORTNESS OF BREATH: 0
COUGH: 0
VOMITING: 0

## 2018-06-18 ASSESSMENT — PAIN SCALES - GENERAL
PAINLEVEL_OUTOF10: 10
PAINLEVEL_OUTOF10: 10

## 2018-06-18 ASSESSMENT — PAIN DESCRIPTION - LOCATION: LOCATION: HEAD

## 2018-06-22 LAB
ALBUMIN SERPL-MCNC: 4.2 G/DL (ref 3.9–4.9)
ALP BLD-CCNC: 83 U/L (ref 40–130)
ALT SERPL-CCNC: 15 U/L (ref 0–33)
ANION GAP SERPL CALCULATED.3IONS-SCNC: 10 MEQ/L (ref 7–13)
AST SERPL-CCNC: 15 U/L (ref 0–35)
BILIRUB SERPL-MCNC: <0.2 MG/DL (ref 0–1.2)
BUN BLDV-MCNC: 11 MG/DL (ref 6–20)
CALCIUM SERPL-MCNC: 8.7 MG/DL (ref 8.6–10.2)
CHLORIDE BLD-SCNC: 101 MEQ/L (ref 98–107)
CO2: 30 MEQ/L (ref 22–29)
CREAT SERPL-MCNC: 0.73 MG/DL (ref 0.5–0.9)
FOLATE: 11.2 NG/ML (ref 7.3–26.1)
GFR AFRICAN AMERICAN: >60
GFR NON-AFRICAN AMERICAN: >60
GLOBULIN: 3.1 G/DL (ref 2.3–3.5)
GLUCOSE BLD-MCNC: 88 MG/DL (ref 74–109)
HBA1C MFR BLD: 5.9 % (ref 4.8–5.9)
HCT VFR BLD CALC: 39.9 % (ref 37–47)
HEMOGLOBIN: 12.9 G/DL (ref 12–16)
MCH RBC QN AUTO: 25.8 PG (ref 27–31.3)
MCHC RBC AUTO-ENTMCNC: 32.3 % (ref 33–37)
MCV RBC AUTO: 79.8 FL (ref 82–100)
PDW BLD-RTO: 15.1 % (ref 11.5–14.5)
PLATELET # BLD: 213 K/UL (ref 130–400)
POTASSIUM SERPL-SCNC: 4.1 MEQ/L (ref 3.5–5.1)
RBC # BLD: 5 M/UL (ref 4.2–5.4)
SODIUM BLD-SCNC: 141 MEQ/L (ref 132–144)
TOTAL PROTEIN: 7.3 G/DL (ref 6.4–8.1)
TSH SERPL DL<=0.05 MIU/L-ACNC: 4.43 UIU/ML (ref 0.27–4.2)
VITAMIN B-12: 304 PG/ML (ref 232–1245)
VITAMIN D 25-HYDROXY: 20.8 NG/ML (ref 30–100)
WBC # BLD: 4.5 K/UL (ref 4.8–10.8)

## 2018-06-25 LAB
ANA INTERPRETATION: NORMAL
ANTI-NUCLEAR ANTIBODY (ANA): NEGATIVE

## 2018-07-09 ENCOUNTER — HOSPITAL ENCOUNTER (OUTPATIENT)
Dept: PHYSICAL THERAPY | Age: 38
Setting detail: THERAPIES SERIES
Discharge: HOME | End: 2018-07-09

## 2018-07-09 PROCEDURE — 97140 MANUAL THERAPY 1/> REGIONS: CPT

## 2018-07-09 PROCEDURE — 97110 THERAPEUTIC EXERCISES: CPT

## 2018-07-09 PROCEDURE — 97162 PT EVAL MOD COMPLEX 30 MIN: CPT

## 2018-07-09 ASSESSMENT — PAIN SCALES - GENERAL: PAINLEVEL_OUTOF10: 8

## 2018-07-09 ASSESSMENT — PAIN DESCRIPTION - LOCATION: LOCATION: BACK

## 2018-07-09 ASSESSMENT — PAIN DESCRIPTION - PAIN TYPE: TYPE: CHRONIC PAIN

## 2018-07-09 ASSESSMENT — PAIN DESCRIPTION - ORIENTATION: ORIENTATION: RIGHT

## 2018-07-09 ASSESSMENT — PAIN DESCRIPTION - DESCRIPTORS: DESCRIPTORS: ACHING;CRAMPING

## 2018-07-09 NOTE — PROGRESS NOTES
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 Prognosis: Good        Decision Making: Medium Complexity  History: BMI   Exam:  strength, ROM, NDIQ 27/50. LEÓN   Clinical Presentation: evolving        Plan  Frequency/Duration:  Plan  Times per week: 2-3  Plan weeks: 5  Current Treatment Recommendations: Strengthening, ROM, Neuromuscular Re-education, Manual Therapy - Soft Tissue Mobilization, Manual Therapy - Joint Manipulation, Pain Management, Modalities, Home Exercise Program, Aquatics, Integrated Dry Needling (pt has home TENS)       G-Codes       Patient Education  New Education Provided: Prone lying    POST-PAIN     Pain Rating (0-10 pain scale):   4/10 LB, 6/10 neck  Location and pain description same as pre-treatment unless indicated. Action: [] NA  [] Call Physician  [x] Perform HEP  [] Meds as prescribed    Evaluation and patient rights have been reviewed and patient agrees with plan of care. Yes  [x]  No  []   Explain:       [place] Fall Risk Assessment  Risk Factor Scale  Score   History of Falls [] Yes  [] No 25  0 0   Secondary Diagnosis [] Yes  [] No 15  0 0   Ambulatory Aid [] Furniture  [] Crutches/cane/walker  [] None/bedrest/wheelchair/nurse 30  15  0 0   IV/Heparin Lock [] Yes  [] No 20  0 0   Gait/Transferring [] Impaired  [] Weak  [] Normal/bedrest/immobile 20  10  0 0   Mental Status [] Forgets limitations  [] Oriented to own ability 15  0 0      Total:0     Based on the Assessment score: check the appropriate box.   [x]  No intervention needed   Low =   Score of 0-24  []  Use standard prevention interventions Moderate =  Score of 24-44   [] Discuss fall prevention strategies   [] Indicate moderate falls risk on eval  []  Use high risk prevention interventions High = Score of 45 and higher   [] Discuss fall prevention strategies   [] Provide supervision during treatment time    Goals  Long term goals  Time Frame for Long term goals : 10-15 visits  Long term goal 1: Dec LB, R LE  and Neck Pain to </= 4/10 at worse  Long term goal 2: Inc Cx AROM Rotation to >/= 60 deg bilaterally to improve hua to ADL's and driving  Long term goal 3:  Inc strength B hip ext to >/= 4+/5 to improve stairs  Long term goal 4: Pt to demo pelvic and sacral symmetry for >/= 2 wks to allow proper lumbo-pelvic mechics during job duties  Long term goal 5: Dec LEÓN to </= 26/50 and NDI to </=17/50         PT Individual Minutes  Time In: 9025  Time Out: 6760  Minutes: 55  Timed Code Treatment Minutes: 11 Minutes  Procedure Minutes:44  Electronically signed by Kemar Becerril PT on 7/9/18 at 9:08 AM

## 2018-07-09 NOTE — PROGRESS NOTES
[address]     Ph: [phone]  Fax: [phone]    [] Certification  [] Recertification [x]  Plan of Care  [] Progress Note [] Discharge      To:  Referring Practitioner: Tiffanie Santos MD      From:  Wei Guzmán, HARSH  Patient: Clem Vargas     : 1980  Diagnosis: Neck Pain;  Back Pain     Date: 2018  Treatment Diagnosis: LBP;  Neck Pain; Impaired ROM, Impaired strength       Progress Report Period from:  2018  to 2018    Total # of Visits to Date: 1   No Show: 0    Canceled Appointment: 0     OBJECTIVE:     Long Term Goals - Time Frame for Long term goals : 10-15 visits  Goals Current/ Discharge status Met   Long term goal 1: Dec LB, R LE  and Neck Pain to </= 4/10 at worse   Pain Location: Back (LB > neck)    Pain Level: 8 (LBP range 4/10 to 10/10 ; Neck range 0/10 - 8/10, currently 6/10)    Pain Descriptors: Aching, Cramping (neck is stabbing only w/ turning)         [] yes  [] no   Long term goal 2: Inc Cx AROM Rotation to >/= 60 deg bilaterally to improve hua to ADL's and driving                      Spine  Cervical: Flex 53;  Ext 40 ERP:  SB B 40 w/ slt ERP; Rot R 37, L 56 both w/ ERP and alterd mvmt mechanics on L   Lumbar: Lx flex curve reversal to neurral only w/ pain; Trunk flexion WFL w/ fingers to mid shins and PDM; Extesnion 75% limited w/ PDM; SB R fingers to 4\" sup knee jt line, L 3\" sup, both e/ ERP        [] yes  [] no   Long term goal 3: Inc strength B hip ext to >/= 4+/5 to improve stairs Strength RLE  Comment: Quad 5/5;  HS 5/5;  DF 5/5;  iliopsoas 4+/5; Abd 4+/5;  Ext >/=3+/5 lmtd by pain;    Strength LLE  Comment: Quad 5/5;  HS 4+/5;  DF 5/5;  iliopsoas 4+/5; Abd 4+/5;   Ext 4+/5 lmtd by pain;          Strength Other  Other: dec core noted w/ bed mobs and LE MMT    [] yes  [] no   Long term goal 4: Pt to demo pelvic and sacral symmetry for >/= 2 wks to allow proper lumbo-pelvic mechics during job duties Elevated L pubic bone corrected this date w/ MET [] yes  [] no   Long term goal 5: Dec LEÓN to </= 26/50 and NDI to </=17/50 LEÓN 36/50   NDI 27/50 [] yes  [] no       [] yes  [] no       [] yes  [] no        Body structures, Functions, Activity limitations: Decreased functional mobility , Decreased ADL status, Decreased ROM, Decreased strength  Assessment:  Pt presents w/ > 1 yr h/o LBP and R radicular pain amd 1 mo onset of B neck pain w/ Rotation and extension; Pt demo's FHP and rounded shldrs, possible disc derangement w/ ext bias, SIJD, dec AROM Lx and neck,dec strength core and B hips; Pt w/ dec function w/ ADL's  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 Prognosis: Good      New Education Provided: Prone lying and HEP   G-Codes       PLAN: [x] Evaluate and Treat  Frequency/Duration:  Plan  Times per week: 2-3  Plan weeks: 5  Current Treatment Recommendations: Strengthening, ROM, Neuromuscular Re-education, Manual Therapy - Soft Tissue Mobilization, Manual Therapy - Joint Manipulation, Pain Management, Modalities, Home Exercise Program, Aquatics, Integrated Dry Needling (pt has home TENS)     Precautions:             ?     Patient Status:[x] Continue/ Initiate plan of Care    [] Discharge PT. Recommend pt continue with HEP. [] Additional visits requested, Please re-certify for additional visits:          Signature: Electronically signed by Eric Rouse PT on 7/9/18 at 9:18 AM      If you have any questions or concerns, please don't hesitate to call. Thank you for your referral.    I have reviewed this plan of care and certify a need for medically necessary rehabilitation services.     Physician Signature:__________________________________________________________  Date:  Please sign and return

## 2018-07-10 ENCOUNTER — HOSPITAL ENCOUNTER (OUTPATIENT)
Dept: PHYSICAL THERAPY | Age: 38
Setting detail: THERAPIES SERIES
Discharge: HOME | End: 2018-07-10

## 2018-07-10 PROCEDURE — 97140 MANUAL THERAPY 1/> REGIONS: CPT

## 2018-07-10 PROCEDURE — 97110 THERAPEUTIC EXERCISES: CPT

## 2018-07-10 ASSESSMENT — PAIN DESCRIPTION - DESCRIPTORS: DESCRIPTORS: ACHING;CRAMPING

## 2018-07-10 ASSESSMENT — PAIN SCALES - GENERAL: PAINLEVEL_OUTOF10: 10

## 2018-07-10 ASSESSMENT — PAIN DESCRIPTION - LOCATION: LOCATION: BACK

## 2018-07-10 ASSESSMENT — PAIN DESCRIPTION - PAIN TYPE: TYPE: CHRONIC PAIN

## 2018-07-10 ASSESSMENT — PAIN DESCRIPTION - ORIENTATION: ORIENTATION: LEFT

## 2018-07-10 NOTE — PROGRESS NOTES
[address] [address]  Outpatient Physical Therapy    Treatment Note        Date: 7/10/2018  Patient: Elise Ryan  : 1980  ACCT #: [de-identified]  Referring Practitioner: Kennedy Rivers MD  Diagnosis: Neck Pain;  Back Pain    Visit Information:  PT Visit Information  Onset Date: 18  PT Insurance Information: Generic commercial   Total # of Visits to Date: 2  No Show: 0  Canceled Appointment: 0  Progress Note Counter: 2/10-15    Subjective: LB is sore today ; No change in the neck ; Has more L LBP this date;  Pt  reported did all the exercises on the sheet and not just the one circles ( included CHRIS, press ups, R and L SGIS )   Comments: RTD in a month; Pt has home TENS   HEP Compliance:  [x] Good [] Fair [] Poor [] Reports not doing due to:    Vital Signs  Patient Currently in Pain: Yes   Pain Screening  Patient Currently in Pain: Yes  Pain Assessment  Pain Assessment: 0-10  Pain Level: 10 (neck is 5/10)  Pain Type: Chronic pain  Pain Location: Back  Pain Orientation: Left (ususally on the R but today is L)  Pain Radiating Towards: R LE to post knee  Pain Descriptors: Aching;Cramping    OBJECTIVE:   Exercises  Exercise 1: Sacral self correct 2( 3 x 10 L then R)   Exercise 2: prone lying x 5 mins centralization from Post R knee to LB  Exercise 3: CHRIS x 5 mins  Exercise 4:  prone press ups  x 4 ( pain in L LB and L shldr blade even w/ reduced range)  Exercise 5:  PKF 3 x 10   Exercise 8: Supine chin nods 5s  x 10  Exercise 10: Scap retraction in supine 5 s x 10  Exercise 17: Edu pt on HEP  Exercise 19: HEp prone lying, CHRIS , Standing exts, supine chin nods                                            Manual:   Manual therapy  Muscle energy: sacral torsion noted  corrected w/ sacral self correct; L unilat ext corrected w/ MET : manual x 6 mins  Soft Tissue Mobalization: STM L Lx parapsinals and Lumbo-sacral fascia    Modalities:        *Indicates exercise, modality, or manual techniques to be initiated when appropriate    Assessment: Body structures, Functions, Activity limitations: Decreased functional mobility , Decreased ADL status, Decreased ROM, Decreased strength  Assessment: Pt to clinic w/ L LBP d/t performing exs on HEP not yet indicated by PT and w/ sacral torsion and unilat ext corrected by MET this date; Pt w/ dec L LBP post manual; Progressed Chapito exs w/ centralization and w/  good hua except to prone press ups this date that were held for now. Treatment Diagnosis: LBP;  Neck Pain; Impaired ROM, Impaired strength          Goals:       Long term goals  Time Frame for Long term goals : 10-15 visits  Long term goal 1: Dec LB, R LE  and Neck Pain to </= 4/10 at worse  Long term goal 2: Inc Cx AROM Rotation to >/= 60 deg bilaterally to improve hua to ADL's and driving  Long term goal 3: Inc strength B hip ext to >/= 4+/5 to improve stairs  Long term goal 4: Pt to demo pelvic and sacral symmetry for >/= 2 wks to allow proper lumbo-pelvic mechics during job duties  Long term goal 5: Dec LEÓN to </= 26/50 and NDI to </=17/50  Progress toward goals:Pt is progressing toward ROM, Strength, Stability and Functional goals. POST-PAIN       Pain Rating (0-10 pain scale): 5  /10 LB, 2/10 Neck   Location and pain description same as pre-treatment unless indicated. Action: [] NA   [x] Perform HEP  [] Meds as prescribed  [] Modalities as prescribed   [] Call Physician     Frequency/Duration:  Plan  Times per week: 2-3  Plan weeks: 5  Current Treatment Recommendations: Strengthening, ROM, Neuromuscular Re-education, Manual Therapy - Soft Tissue Mobilization, Manual Therapy - Joint Manipulation, Pain Management, Modalities, Home Exercise Program, Aquatics, Integrated Dry Needling (pt has home TENS)     Pt to continue current HEP. See objective section for any therapeutic exercise changes, additions or modifications this date.          PT Individual Minutes  Time In: 8128  Time Out: 8038  Minutes: 41  Timed Code Treatment Minutes: 41 Minutes  Procedure Minutes:0    Signature:  Electronically signed by Charly Alexandre PT on 7/10/18 at 10:06 AM

## 2018-07-17 ENCOUNTER — HOSPITAL ENCOUNTER (OUTPATIENT)
Dept: PHYSICAL THERAPY | Age: 38
Setting detail: THERAPIES SERIES
Discharge: HOME | End: 2018-07-17

## 2018-07-17 PROCEDURE — 97110 THERAPEUTIC EXERCISES: CPT

## 2018-07-17 PROCEDURE — 97140 MANUAL THERAPY 1/> REGIONS: CPT

## 2018-07-17 ASSESSMENT — PAIN DESCRIPTION - PAIN TYPE: TYPE: CHRONIC PAIN

## 2018-07-17 ASSESSMENT — PAIN SCALES - GENERAL: PAINLEVEL_OUTOF10: 6

## 2018-07-17 ASSESSMENT — PAIN DESCRIPTION - DESCRIPTORS: DESCRIPTORS: THROBBING

## 2018-07-17 ASSESSMENT — PAIN DESCRIPTION - ORIENTATION: ORIENTATION: LEFT

## 2018-07-17 ASSESSMENT — PAIN DESCRIPTION - LOCATION: LOCATION: NECK

## 2018-07-18 ENCOUNTER — HOSPITAL ENCOUNTER (OUTPATIENT)
Dept: PHYSICAL THERAPY | Age: 38
Setting detail: THERAPIES SERIES
Discharge: HOME | End: 2018-07-18

## 2018-07-18 PROCEDURE — 97140 MANUAL THERAPY 1/> REGIONS: CPT

## 2018-07-18 PROCEDURE — 97110 THERAPEUTIC EXERCISES: CPT

## 2018-07-18 NOTE — PROGRESS NOTES
[address] [address]  Outpatient Physical Therapy    Treatment Note        Date: 2018  Patient: Carleen Gerard  : 1980  ACCT #: [de-identified]  Referring Practitioner: Julia Gillespie MD  Diagnosis: Neck Pain;  Back Pain    Visit Information:  PT Visit Information  Onset Date: 18  PT Insurance Information: Generic commercial   Total # of Visits to Date: 4  No Show: 0  Canceled Appointment: 0  Progress Note Counter: 4/10-15    Subjective: Back is really good but L ( UT) is very sore. Comments: RTD in a month; Pt has home TENS   HEP Compliance:  [x] Good [] Fair [] Poor [] Reports not doing due to:    Vital Signs  Patient Currently in Pain: Yes   Pain Screening  Patient Currently in Pain: Yes    OBJECTIVE:   Exercises  Exercise 8: Supine chin nods 5s  x 15  Exercise 9: Nod w/ rotation in pain free range C-R-C-L-C x 10  Exercise 10: Supine TB chest pulls IR/ ER YTB x 12  Exercise 12: Scap retraction in supine 5 s x 15  Exercise 13: TB Rows/ Lats RTB x 10 ea  Exercise 14: TB chest pulls diagonals YTB x 10 ea  Exercise 15: B S/L Abd and circles x 10 ea  Exercise 16: iso bridges 10s x 10  Exercise 17: H/L abd w/ ball 5s x 10                        Manual:   Manual therapy  Joint mobilization: SNAG to L C2 w/ R SB x 6  Muscle energy: good SIJ mobility and symmetry this date  Soft Tissue Mobalization: STM suboccipitals, Cx paraspinals, UTs, SCMs,; Total manual 11 mins    Modalities:        *Indicates exercise, modality, or manual techniques to be initiated when appropriate    Assessment: Body structures, Functions, Activity limitations: Decreased functional mobility , Decreased ADL status, Decreased ROM, Decreased strength  Assessment: Pt demos full R Cx rotation w/out pain and reported pain-free R SB after Mulligan techniques this date; Porgressed DNF, Scap stab and hip strengthening w/ good hua  Treatment Diagnosis: LBP;  Neck Pain;   Impaired ROM, Impaired strength          Goals:       Long

## 2018-07-19 ENCOUNTER — HOSPITAL ENCOUNTER (OUTPATIENT)
Dept: PHYSICAL THERAPY | Age: 38
Setting detail: THERAPIES SERIES
Discharge: HOME | End: 2018-07-19

## 2018-07-19 PROCEDURE — 97140 MANUAL THERAPY 1/> REGIONS: CPT

## 2018-07-19 PROCEDURE — 97110 THERAPEUTIC EXERCISES: CPT

## 2018-07-19 ASSESSMENT — PAIN DESCRIPTION - ORIENTATION: ORIENTATION: ANTERIOR

## 2018-07-19 ASSESSMENT — PAIN DESCRIPTION - PAIN TYPE: TYPE: CHRONIC PAIN

## 2018-07-19 ASSESSMENT — PAIN DESCRIPTION - LOCATION: LOCATION: SHOULDER

## 2018-07-19 ASSESSMENT — PAIN SCALES - GENERAL: PAINLEVEL_OUTOF10: 4

## 2018-07-19 NOTE — PROGRESS NOTES
[address] [address]  Outpatient Physical Therapy    Treatment Note        Date: 2018  Patient: Nallely Albrecht  : 1980  ACCT #: [de-identified]  Referring Practitioner: Pastor Etelvina MD  Diagnosis: Neck Pain;  Back Pain    Visit Information:  PT Visit Information  Onset Date: 18  PT Insurance Information: Generic commercial   Total # of Visits to Date: 5  No Show: 0  Canceled Appointment: 0  Progress Note Counter: 5/10-15    Subjective: L shoulder  ( top and ant to GHJ) is still a little sore but much better and no adithya nw/ neck mvmts. Comments: RTD in a month; Pt has home TENS   HEP Compliance:  [x] Good [] Fair [] Poor [] Reports not doing due to:    Vital Signs  Patient Currently in Pain: Yes   Pain Screening  Patient Currently in Pain: Yes  Pain Assessment  Pain Assessment: 0-10  Pain Level: 4  Pain Type: Chronic pain  Pain Location: Shoulder  Pain Orientation: Anterior    OBJECTIVE:   Exercises  Exercise 8: Supine chin nods 5s  x 20  Exercise 9: Nod w/ rotation in pain free range C-R-C-L-C x 10  Exercise 10: Supine TB chest pulls IR/ ER YTB x 12  Exercise 12: Scap retraction in sitting  5 s x 12  Exercise 13: TB Rows/ Lats RTB 2x 10  Exercise 15: B S/L Abd and circles x 10 ea  Exercise 16: iso bridges 10s x 15  Exercise 17: H/L abd w/ ball 5s x 20  Exercise 18: H/L Abd 5s x 20  Exercise 19: UBE lev 2.0 Rev x 4 mins                         Manual:   Manual therapy  Soft Tissue Mobalization: STM B: suboccipitals, Cx paraspinals, UTs, L:  SCM sup and ant GHJ, deltoid ; Total manual 14 mins    Modalities:        *Indicates exercise, modality, or manual techniques to be initiated when appropriate    Assessment:         Body structures, Functions, Activity limitations: Decreased functional mobility , Decreased ADL status, Decreased ROM, Decreased strength  Assessment: Pt w/ c/o sup and ant L shldr pain possibley d/t addition of scap stab exs yesterday; improved form of TB chest pulls this date; Pt reports pain-free Cx AROM; Treatment Diagnosis: LBP;  Neck Pain; Impaired ROM, Impaired strength          Goals:       Long term goals  Time Frame for Long term goals : 10-15 visits  Long term goal 1: Dec LB, R LE  and Neck Pain to </= 4/10 at worse  Long term goal 2: Inc Cx AROM Rotation to >/= 60 deg bilaterally to improve hua to ADL's and driving  Long term goal 3: Inc strength B hip ext to >/= 4+/5 to improve stairs  Long term goal 4: Pt to demo pelvic and sacral symmetry for >/= 2 wks to allow proper lumbo-pelvic mechics during job duties  Long term goal 5: Dec LEÓN to </= 26/50 and NDI to </=17/50  Progress toward goals:    POST-PAIN       Pain Rating (0-10 pain scale): 1/10   Location and pain description same as pre-treatment unless indicated. Action: [] NA   [] Perform HEP  [] Meds as prescribed  [] Modalities as prescribed   [] Call Physician     Frequency/Duration:  Plan  Times per week: 2-3  Plan weeks: 5  Current Treatment Recommendations: Strengthening, ROM, Neuromuscular Re-education, Manual Therapy - Soft Tissue Mobilization, Manual Therapy - Joint Manipulation, Pain Management, Modalities, Home Exercise Program, Aquatics, Integrated Dry Needling (pt has home TENS)     Pt to continue current HEP. See objective section for any therapeutic exercise changes, additions or modifications this date.          PT Individual Minutes  Time In: 0601  Time Out: 1543  Minutes: 40  Timed Code Treatment Minutes: 40 Minutes  Procedure Minutes:0    Signature:  Electronically signed by Roslyn Stark PT on 7/19/18 at 3:43 PM

## 2018-07-23 ENCOUNTER — APPOINTMENT (OUTPATIENT)
Dept: PHYSICAL THERAPY | Age: 38
End: 2018-07-23

## 2018-07-23 ENCOUNTER — HOSPITAL ENCOUNTER (OUTPATIENT)
Dept: PHYSICAL THERAPY | Age: 38
Setting detail: THERAPIES SERIES
Discharge: HOME | End: 2018-07-23

## 2018-07-23 PROCEDURE — 97110 THERAPEUTIC EXERCISES: CPT

## 2018-07-23 PROCEDURE — 97140 MANUAL THERAPY 1/> REGIONS: CPT

## 2018-07-23 ASSESSMENT — PAIN DESCRIPTION - ORIENTATION: ORIENTATION: LEFT;ANTERIOR

## 2018-07-23 ASSESSMENT — PAIN DESCRIPTION - DESCRIPTORS: DESCRIPTORS: SHARP

## 2018-07-23 ASSESSMENT — PAIN DESCRIPTION - LOCATION: LOCATION: SHOULDER

## 2018-07-23 ASSESSMENT — PAIN DESCRIPTION - PAIN TYPE: TYPE: CHRONIC PAIN

## 2018-07-23 ASSESSMENT — PAIN SCALES - GENERAL: PAINLEVEL_OUTOF10: 9

## 2018-07-25 ENCOUNTER — HOSPITAL ENCOUNTER (OUTPATIENT)
Dept: PHYSICAL THERAPY | Age: 38
Setting detail: THERAPIES SERIES
Discharge: HOME | End: 2018-07-25

## 2018-07-25 NOTE — PROGRESS NOTES
[address]       Physical Therapy  Cancellation/No-show Note  Patient Name:  Ya Mejias  :  1980   Date:  2018  Referring Practitioner: Susi Walker MD  Diagnosis: Neck Pain;  Back Pain    Visit Information:  PT Visit Information  Onset Date: 18  PT Insurance Information: Generic commercial   Total # of Visits to Date: 6  No Show: 0  Canceled Appointment: 1  Progress Note Counter: 6/10-15 ( cx 18)     For today's appointment patient:  [x]  Cancelled  []  Rescheduled appointment  []  No-show   []  Called pt to remind of next appointment     Reason given by patient:  []  Patient ill  []  Conflicting appointment  []  No transportation    []  Conflict with work  []  No reason given  [x]  Other:       Comments:   Has orthopedic appt tomorrow for shoulder  And will call PT [facility] to [facility]    Signature: Electronically signed by Micaela Austin PT on 18 at 5:09 PM

## 2018-07-26 ENCOUNTER — HOSPITAL ENCOUNTER (OUTPATIENT)
Dept: PHYSICAL THERAPY | Age: 38
Setting detail: THERAPIES SERIES
Discharge: HOME | End: 2018-07-26

## 2018-07-27 ENCOUNTER — APPOINTMENT (OUTPATIENT)
Dept: PHYSICAL THERAPY | Age: 38
End: 2018-07-27

## 2018-07-30 ENCOUNTER — APPOINTMENT (OUTPATIENT)
Dept: PHYSICAL THERAPY | Age: 38
End: 2018-07-30

## 2018-08-01 ENCOUNTER — APPOINTMENT (OUTPATIENT)
Dept: PHYSICAL THERAPY | Age: 38
End: 2018-08-01
Payer: COMMERCIAL

## 2018-08-02 ENCOUNTER — APPOINTMENT (OUTPATIENT)
Dept: PHYSICAL THERAPY | Age: 38
End: 2018-08-02
Payer: COMMERCIAL

## 2018-08-06 ENCOUNTER — APPOINTMENT (OUTPATIENT)
Dept: PHYSICAL THERAPY | Age: 38
End: 2018-08-06
Payer: COMMERCIAL

## 2018-08-08 ENCOUNTER — APPOINTMENT (OUTPATIENT)
Dept: PHYSICAL THERAPY | Age: 38
End: 2018-08-08
Payer: COMMERCIAL

## 2018-08-09 ENCOUNTER — APPOINTMENT (OUTPATIENT)
Dept: PHYSICAL THERAPY | Age: 38
End: 2018-08-09
Payer: COMMERCIAL

## 2018-09-05 ENCOUNTER — CLINICAL DOCUMENTATION (OUTPATIENT)
Dept: PHYSICAL THERAPY | Age: 38
End: 2018-09-05

## 2018-09-05 NOTE — PROGRESS NOTES
[address]                                  Ph: [phone]  Fax: [phone]     [] Certification  [] Recertification  []  Plan of Care  [] Progress Note [x] Discharge                            To:  Referring Practitioner: Kate Heath MD                From:  Leon Damon, PT  Patient: Viri Elliott     : 1980  Diagnosis: Neck Pain;  Back Pain     Date: 2018  Treatment Diagnosis: LBP;  Neck Pain; Impaired ROM, Impaired strength     Progress Report Period from:  2018  to 18     Total # of Visits to Date: 6   No Show: 2    Canceled Appointment: 0      OBJECTIVE:      Long Term Goals - Time Frame for Long term goals : 10-15 visits  Goals Current/ Discharge status Met   Long term goal 1: Dec LB, R LE  and Neck Pain to </= 4/10 at worse nt d/t unexpected D/C [] yes  [] no   Long term goal 2: Inc Cx AROM Rotation to >/= 60 deg bilaterally to improve hua to ADL's and driving nt d/t unexpected D/C [] yes  [] no   Long term goal 3: Inc strength B hip ext to >/= 4+/5 to improve stairs  MMT completed:18 ( last visit)  Strength RLE  Comment: Hip ext 4/5  Strength LLE  Comment: hip ext 4/5 [] yes  [] no   Long term goal 4: Pt to demo pelvic and sacral symmetry for >/= 2 wks to allow proper lumbo-pelvic mechics during job duties nt d/t unexpected D/C [] yes  [] no   Long term goal 5: Dec LEÓN to </= 26/50 and NDI to </=17/50 nt d/t unexpected D/C [] yes  [] no      [] yes  [] no       [] yes  [] no            Assessment:  Pt did not return after 6th visit on 18; D/C        New Education Provided: Prone lying and HEP   G-Codes     PLAN: D/C             ? Patient Status:[] Continue/ Initiate plan of Care                          [x] Discharge PT. Recommend pt continue with HEP.                            [] Additional visits requested, Please re-certify for additional visits:                                                    Electronically signed by Minesh Lawrence PT on 9/5/2018 at 3:13 PM

## 2018-09-07 ENCOUNTER — HOSPITAL ENCOUNTER (OUTPATIENT)
Dept: PHYSICAL THERAPY | Age: 38
Setting detail: THERAPIES SERIES
Discharge: HOME | End: 2018-09-07
Payer: COMMERCIAL

## 2018-09-07 PROCEDURE — 97110 THERAPEUTIC EXERCISES: CPT

## 2018-09-07 PROCEDURE — 97162 PT EVAL MOD COMPLEX 30 MIN: CPT

## 2018-09-07 ASSESSMENT — PAIN DESCRIPTION - ORIENTATION: ORIENTATION: LEFT;ANTERIOR

## 2018-09-07 ASSESSMENT — PAIN SCALES - GENERAL: PAINLEVEL_OUTOF10: 8

## 2018-09-07 ASSESSMENT — PAIN DESCRIPTION - LOCATION: LOCATION: SHOULDER

## 2018-09-07 ASSESSMENT — PAIN DESCRIPTION - PROGRESSION: CLINICAL_PROGRESSION: GRADUALLY WORSENING

## 2018-09-07 ASSESSMENT — PAIN DESCRIPTION - PAIN TYPE: TYPE: ACUTE PAIN

## 2018-09-07 ASSESSMENT — PAIN DESCRIPTION - FREQUENCY: FREQUENCY: CONTINUOUS

## 2018-09-07 ASSESSMENT — PAIN DESCRIPTION - ONSET: ONSET: AWAKENED FROM SLEEP

## 2018-09-07 NOTE — PROGRESS NOTES
[address]     Ph: [phone]  Fax: [phone]    [] Certification  [] Recertification [x]  Plan of Care  [] Progress Note [] Discharge      To:  Referring Practitioner: Dr Rachel Araiza      From:  [facility], PT  Patient: Marilou Lemus     : 1980  Diagnosis: L Shoulder bursitis/tendonitis, bicep tendonitis,      Date: 2018  Treatment Diagnosis:  L shoulder pain     Progress Report Period from:  2018  to 2018    Total # of Visits to Date: 7 from previous    No Show: 0    Canceled Appointment: 0     OBJECTIVE:   Short Term Goals - Time Frame for Short term goals: 4 weeks    Goals Current/Discharge status  Met   Short term goal 1: Indep HEP for symptom mgmt  Need HEP progression [] yes  [x] no   Short term goal 2: 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 [] yes  [x] no   Short term goal 4: Strength 4/5 L shoulder for improved  UEFI score >40/80   Strength LUE  Comment: Flex 3/5, Abd 3-/5, IR/ER 3/5    [] yes  [x] no       Body structures, Functions, Activity limitations: Decreased ADL status, Decreased ROM, Decreased strength  Assessment: Pt very guarded L UE with movements. The pt's impairments currently limit functional abilities by 62% including her abilities to reach, perform recreational actvities, and perform household/work related duties without pain or limitations.    Prognosis: Good  New Education Provided: Written HEP initiated for shoulder strengthening     PLAN: [x] Evaluate and

## 2018-09-07 NOTE — PROGRESS NOTES
Plan  Frequency/Duration:  Plan  Times per week: 2-3  Plan weeks: 4  Current Treatment Recommendations: Strengthening, ROM, Neuromuscular Re-education, Manual Therapy - Soft Tissue Mobilization, Manual Therapy - Joint Manipulation, Pain Management, Modalities, Home Exercise Program, Aquatics, Integrated Dry Needling (pt has home TENS)       Patient Education  New Education Provided: Written HEP initiated for shoulder strengthening    POST-PAIN     Pain Rating (0-10 pain scale):  5 /10  Location and pain description same as pre-treatment unless indicated. Action: [] NA  [] Call Physician  [] Perform HEP  [] Meds as prescribed    Evaluation and patient rights have been reviewed and patient agrees with plan of care. Yes  [x]  No  []   Explain:       [place] Fall Risk Assessment  Risk Factor Scale  Score   History of Falls [] Yes  [x] No 25  0 0   Secondary Diagnosis [x] Yes  [] No 15  0 15   Ambulatory Aid [] Furniture  [] Crutches/cane/walker  [x] None/bedrest/wheelchair/nurse 30  15  0 0   IV/Heparin Lock [] Yes  [x] No 20  0 0   Gait/Transferring [] Impaired  [] Weak  [x] Normal/bedrest/immobile 20  10  0 0   Mental Status [] Forgets limitations  [x] Oriented to own ability 15  0 0      Total:15     Based on the Assessment score: check the appropriate box. [x]  No intervention needed   Low =   Score of 0-24  []  Use standard prevention interventions Moderate =  Score of 24-44   [] Discuss fall prevention strategies   [] Indicate moderate falls risk on eval  []  Use high risk prevention interventions High = Score of 45 and higher   [] Discuss fall prevention strategies   [] Provide supervision during treatment time    Goals  Long term goals  Time Frame for Long term goals : 10-15 visits  Long term goal 1: Dec LB, R LE  and Neck Pain to </= 4/10 at worse  Long term goal 2: Inc Cx AROM Rotation to >/= 60 deg bilaterally to improve hua to ADL's and driving  Long term goal 3:  Inc strength B hip ext to >/= 4+/5 to improve stairs  Long term goal 4: Pt to demo pelvic and sacral symmetry for >/= 2 wks to allow proper lumbo-pelvic mechics during job duties  Long term goal 5: Dec LEÓN to </= 26/50 and NDI to </=17/50         PT Individual Minutes  Time In: 6053  Time Out: 0940  Minutes: 31  Timed Code Treatment Minutes: 8 Minutes  Procedure Minutes:23  Electronically signed by Jayne Combs PT on 9/7/18 at 9:50 AM

## 2018-09-12 ENCOUNTER — HOSPITAL ENCOUNTER (OUTPATIENT)
Dept: PHYSICAL THERAPY | Age: 38
Setting detail: THERAPIES SERIES
Discharge: HOME | End: 2018-09-12
Payer: COMMERCIAL

## 2018-09-12 ASSESSMENT — PAIN DESCRIPTION - PROGRESSION: CLINICAL_PROGRESSION: GRADUALLY WORSENING

## 2018-09-19 ENCOUNTER — HOSPITAL ENCOUNTER (OUTPATIENT)
Dept: PHYSICAL THERAPY | Age: 38
Setting detail: THERAPIES SERIES
Discharge: HOME | End: 2018-09-19
Payer: COMMERCIAL

## 2018-09-19 PROCEDURE — 97110 THERAPEUTIC EXERCISES: CPT

## 2018-09-19 PROCEDURE — 97140 MANUAL THERAPY 1/> REGIONS: CPT

## 2018-09-19 ASSESSMENT — PAIN DESCRIPTION - PROGRESSION: CLINICAL_PROGRESSION: GRADUALLY WORSENING

## 2018-09-19 ASSESSMENT — PAIN SCALES - GENERAL: PAINLEVEL_OUTOF10: 4

## 2018-09-19 NOTE — PROGRESS NOTES
[id number] [address]  Outpatient Physical Therapy    Treatment Note        Date: 2018  Patient: Kemal Soria  : 1980  ACCT #: [de-identified]  Referring Practitioner: Dr Fawn Rodriguez  Diagnosis: L Shoulder bursitis/tendonitis, bicep tendonitis,     Visit Information:  PT Visit Information  Onset Date: 18  PT Insurance Information: [employer]  Total # of Visits Approved: 7  Total # of Visits to Date: 8  No Show: 0  Canceled Appointment: 1  Progress Note Counter:  Cx 18    Subjective: Pt has been doing her shoulder HEp and it has been helping; Pt states she wants to stick w/ the exs as they are helping vs DN at this point. HEP Compliance:  [x] Good [] Fair [] Poor [] Reports not doing due to:    Vital Signs  Patient Currently in Pain: Yes   Pain Screening  Patient Currently in Pain: Yes  Pain Assessment  Pain Assessment: 0-10  Pain Level: 4  Clinical Progression: Gradually worsening    OBJECTIVE:   Exercises  Exercise 2: shoulder isometrics 5s x 8  Exercise 3: table slides flex and scaption 5s x 10 ea  Exercise 4: finger ladder flex and scaption x 8 ea  Exercise 9: Prone Rhmd,/ MT x 10 ea  Exercise 10: prone LT x 10  Exercise 11: supine serratus presses x 15  Exercise 12: S/L L GHJ Abd, flex, ER x 15 ea   Exercise 20: HEP: prone scap stab exs, table slides                                 Strength: [] NT  [x] MMT completed:              Strength Other  Other: R: RHmbd, MT 4+/5, LT 4/5;  L: Rhmbd, MT 4/5, LT 4-/5    ROM: [] NT  [] ROM measurements:                             Manual:   Manual therapy  Joint mobilization: GHJ L mobs gr III inf and post mobs  PROM: flex, abd, IR, ER WNL w/ pain at end range of flexion  Soft Tissue Mobalization: STM L UT ; Total manual 6 mins    Modalities:        *Indicates exercise, modality, or manual techniques to be initiated when appropriate    Assessment:         Body structures, Functions, Activity limitations: Decreased ADL status, Decreased

## 2018-09-21 ENCOUNTER — HOSPITAL ENCOUNTER (OUTPATIENT)
Dept: PHYSICAL THERAPY | Age: 38
Setting detail: THERAPIES SERIES
Discharge: HOME | End: 2018-09-21
Payer: COMMERCIAL

## 2018-09-21 PROCEDURE — 97110 THERAPEUTIC EXERCISES: CPT

## 2018-09-21 ASSESSMENT — PAIN DESCRIPTION - ORIENTATION: ORIENTATION: LEFT

## 2018-09-21 ASSESSMENT — PAIN SCALES - GENERAL: PAINLEVEL_OUTOF10: 2

## 2018-09-21 ASSESSMENT — PAIN DESCRIPTION - PAIN TYPE: TYPE: ACUTE PAIN

## 2018-09-21 ASSESSMENT — PAIN DESCRIPTION - LOCATION: LOCATION: SHOULDER

## 2018-09-21 ASSESSMENT — PAIN DESCRIPTION - DESCRIPTORS: DESCRIPTORS: ACHING

## 2018-09-21 ASSESSMENT — PAIN DESCRIPTION - PROGRESSION: CLINICAL_PROGRESSION: GRADUALLY IMPROVING

## 2018-09-24 ENCOUNTER — HOSPITAL ENCOUNTER (OUTPATIENT)
Dept: PHYSICAL THERAPY | Age: 38
Setting detail: THERAPIES SERIES
Discharge: HOME | End: 2018-09-24
Payer: COMMERCIAL

## 2018-09-24 PROCEDURE — 97110 THERAPEUTIC EXERCISES: CPT

## 2018-09-24 ASSESSMENT — PAIN DESCRIPTION - PROGRESSION: CLINICAL_PROGRESSION: GRADUALLY IMPROVING

## 2018-09-26 ENCOUNTER — HOSPITAL ENCOUNTER (OUTPATIENT)
Dept: PHYSICAL THERAPY | Age: 38
Setting detail: THERAPIES SERIES
End: 2018-09-26
Payer: COMMERCIAL

## 2018-10-03 ENCOUNTER — HOSPITAL ENCOUNTER (OUTPATIENT)
Dept: PHYSICAL THERAPY | Age: 38
Setting detail: THERAPIES SERIES
Discharge: HOME | End: 2018-10-03

## 2018-10-05 ENCOUNTER — HOSPITAL ENCOUNTER (OUTPATIENT)
Dept: PHYSICAL THERAPY | Age: 38
Setting detail: THERAPIES SERIES
Discharge: HOME | End: 2018-10-05

## 2018-10-05 PROCEDURE — 97035 APP MDLTY 1+ULTRASOUND EA 15: CPT

## 2018-10-05 PROCEDURE — 97110 THERAPEUTIC EXERCISES: CPT

## 2018-10-05 ASSESSMENT — PAIN DESCRIPTION - LOCATION: LOCATION: SHOULDER

## 2018-10-05 ASSESSMENT — PAIN DESCRIPTION - ORIENTATION: ORIENTATION: LEFT

## 2018-10-05 ASSESSMENT — PAIN DESCRIPTION - PAIN TYPE: TYPE: ACUTE PAIN

## 2018-10-05 ASSESSMENT — PAIN SCALES - GENERAL: PAINLEVEL_OUTOF10: 2

## 2018-10-05 NOTE — PROGRESS NOTES
[id number] [address]  Outpatient Physical Therapy    Treatment Note        Date: 10/12/2018  Patient: Rosalba Snell  : 1980  ACCT #: [de-identified]  Referring Practitioner: Dr Katelyn Rodriguez  Diagnosis: L Shoulder bursitis/tendonitis, bicep tendonitis,     Visit Information:  PT Visit Information  Onset Date: 18  PT Insurance Information: National [employer]  Total # of Visits Approved: 7  Total # of Visits to Date: 11  No Show: 0  Canceled Appointment: 3  Progress Note Counter:            HEP Compliance:  [x] Good [] Fair [] Poor [] Reports not doing due to:             OBJECTIVE:   Exercises  Exercise 1: pulleys 4 min  Exercise 3: wall slides 10 sec x 5, flex/abd  Exercise 6: IR stretch, 20 sec x 3, with strap  Exercise 8: corner stretch 20 sec x 3  Exercise 11: SA punches x 20, RTB, standing  Exercise 13: TB Rows/ Lats x 20, RTB  Exercise 14: TB chest pulls, diagonals, x 15, RTB  Exercise 15: flexion, abd x 15, with 1# wt  Exercise 19: UBE lev 2.0, 5 min total       Strength: [] NT  [x] MMT completed:      Strength SARAH  Comment: shoulder flexion 4+/5       ROM: [x] NT  [] ROM measurements:     Modalities:  Modalities  Ultrasound: 1.2 w/cm^2 50%, ant deltoid 3 MHZ* L shoulder, 5 min     *Indicates exercise, modality, or manual techniques to be initiated when appropriate    Assessment:         Body structures, Functions, Activity limitations: Decreased ADL status, Decreased ROM, Decreased strength  Assessment: discomfort with wall push ups, held ex, good hua to all other ex  Treatment Diagnosis:  L shoulder pain          Goals:  Short term goals  Time Frame for Short term goals: 4 weeks  Short term goal 1: Indep HEP for symptom mgmt  Short term goal 2: Pain L shoulder <2/10 with activity including transferring clients  Short term goal 3: Painfree ROM WFL L shoulder througout  Short term goal 4: Strength 4/5 L shoulder for improved  UEFI score >40/80       Progress toward goals:improved

## 2018-10-10 ENCOUNTER — HOSPITAL ENCOUNTER (OUTPATIENT)
Dept: PHYSICAL THERAPY | Age: 38
Setting detail: THERAPIES SERIES
Discharge: HOME | End: 2018-10-10

## 2018-10-10 PROCEDURE — 97110 THERAPEUTIC EXERCISES: CPT

## 2018-10-10 ASSESSMENT — PAIN SCALES - GENERAL: PAINLEVEL_OUTOF10: 4

## 2018-10-10 ASSESSMENT — PAIN DESCRIPTION - DESCRIPTORS: DESCRIPTORS: ACHING

## 2018-10-10 ASSESSMENT — PAIN DESCRIPTION - ORIENTATION: ORIENTATION: LEFT

## 2018-10-10 ASSESSMENT — PAIN DESCRIPTION - LOCATION: LOCATION: SHOULDER

## 2018-10-12 ENCOUNTER — HOSPITAL ENCOUNTER (OUTPATIENT)
Dept: PHYSICAL THERAPY | Age: 38
Setting detail: THERAPIES SERIES
End: 2018-10-12

## 2018-10-17 ENCOUNTER — HOSPITAL ENCOUNTER (OUTPATIENT)
Dept: PHYSICAL THERAPY | Age: 38
Setting detail: THERAPIES SERIES
Discharge: HOME | End: 2018-10-17

## 2018-10-17 NOTE — PROGRESS NOTES
[facility]       Physical Therapy  Cancellation/No-show Note  Patient Name:  Tanja Escoto  :  1980   Date:  10/17/2018  Referring Practitioner: Dr Jay Powell  Diagnosis: L Shoulder bursitis/tendonitis, bicep tendonitis,     Visit Information:  PT Visit Information  Onset Date: 18  PT Insurance Information: National [employer]  Total # of Visits to Date: 13  No Show: 0  Canceled Appointment: 4  Progress Note Counter:   cx 10-    For today's appointment patient:  [x]  Cancelled  []  Rescheduled appointment  []  No-show   []  Called pt to remind of next appointment     Reason given by patient:  []  Patient ill  [x]  Conflicting appointment  []  No transportation    []  Conflict with work  []  No reason given  []  Other:       Comments:       Signature: Electronically signed by Azeb Espinoza PT on 10/17/18 at 12:30 PM

## 2018-11-09 ENCOUNTER — ANESTHESIA EVENT (OUTPATIENT)
Dept: OPERATING ROOM | Age: 38
End: 2018-11-09
Payer: COMMERCIAL

## 2018-11-12 ENCOUNTER — ANESTHESIA (OUTPATIENT)
Dept: OPERATING ROOM | Age: 38
End: 2018-11-12
Payer: COMMERCIAL

## 2018-11-12 ENCOUNTER — HOSPITAL ENCOUNTER (OUTPATIENT)
Age: 38
Setting detail: OUTPATIENT SURGERY
Discharge: HOME | End: 2018-11-12
Attending: ORTHOPAEDIC SURGERY | Admitting: ORTHOPAEDIC SURGERY
Payer: COMMERCIAL

## 2018-11-12 VITALS
HEIGHT: 67 IN | RESPIRATION RATE: 16 BRPM | TEMPERATURE: 97.8 F | BODY MASS INDEX: 34.53 KG/M2 | OXYGEN SATURATION: 97 % | DIASTOLIC BLOOD PRESSURE: 56 MMHG | HEART RATE: 60 BPM | SYSTOLIC BLOOD PRESSURE: 117 MMHG | WEIGHT: 220 LBS

## 2018-11-12 VITALS — DIASTOLIC BLOOD PRESSURE: 58 MMHG | SYSTOLIC BLOOD PRESSURE: 105 MMHG | OXYGEN SATURATION: 100 %

## 2018-11-12 DIAGNOSIS — G56.01 CARPAL TUNNEL SYNDROME, RIGHT: Primary | ICD-10-CM

## 2018-11-12 LAB
ANISOCYTOSIS: ABNORMAL
ATYPICAL LYMPHOCYTE RELATIVE PERCENT: 1 %
BASOPHILS ABSOLUTE: 0 K/UL (ref 0–0.2)
BASOPHILS RELATIVE PERCENT: 0.9 %
EOSINOPHILS ABSOLUTE: 0.1 K/UL (ref 0–0.7)
EOSINOPHILS RELATIVE PERCENT: 2 %
HCT VFR BLD CALC: 40.4 % (ref 37–47)
HEMOGLOBIN: 13.1 G/DL (ref 12–16)
LYMPHOCYTES ABSOLUTE: 0.8 K/UL (ref 1–4.8)
LYMPHOCYTES RELATIVE PERCENT: 20 %
MACROCYTES: ABNORMAL
MCH RBC QN AUTO: 25.4 PG (ref 27–31.3)
MCHC RBC AUTO-ENTMCNC: 32.4 % (ref 33–37)
MCV RBC AUTO: 78.4 FL (ref 82–100)
MICROCYTES: ABNORMAL
MONOCYTES ABSOLUTE: 0.3 K/UL (ref 0.2–0.8)
MONOCYTES RELATIVE PERCENT: 9.4 %
NEUTROPHILS ABSOLUTE: 2.6 K/UL (ref 1.4–6.5)
NEUTROPHILS RELATIVE PERCENT: 68 %
PDW BLD-RTO: 15.2 % (ref 11.5–14.5)
PLATELET # BLD: 179 K/UL (ref 130–400)
PLATELET SLIDE REVIEW: NORMAL
RBC # BLD: 5.16 M/UL (ref 4.2–5.4)
WBC # BLD: 3.8 K/UL (ref 4.8–10.8)

## 2018-11-12 PROCEDURE — 3700000001 HC ADD 15 MINUTES (ANESTHESIA): Performed by: ORTHOPAEDIC SURGERY

## 2018-11-12 PROCEDURE — 6360000002 HC RX W HCPCS: Performed by: NURSE ANESTHETIST, CERTIFIED REGISTERED

## 2018-11-12 PROCEDURE — 7100000011 HC PHASE II RECOVERY - ADDTL 15 MIN: Performed by: ORTHOPAEDIC SURGERY

## 2018-11-12 PROCEDURE — 2500000003 HC RX 250 WO HCPCS: Performed by: ANESTHESIOLOGY

## 2018-11-12 PROCEDURE — 2709999900 HC NON-CHARGEABLE SUPPLY: Performed by: ORTHOPAEDIC SURGERY

## 2018-11-12 PROCEDURE — 6370000000 HC RX 637 (ALT 250 FOR IP): Performed by: ANESTHESIOLOGY

## 2018-11-12 PROCEDURE — 2500000003 HC RX 250 WO HCPCS: Performed by: ORTHOPAEDIC SURGERY

## 2018-11-12 PROCEDURE — 3600000014 HC SURGERY LEVEL 4 ADDTL 15MIN: Performed by: ORTHOPAEDIC SURGERY

## 2018-11-12 PROCEDURE — 2580000003 HC RX 258: Performed by: ORTHOPAEDIC SURGERY

## 2018-11-12 PROCEDURE — 7100000010 HC PHASE II RECOVERY - FIRST 15 MIN: Performed by: ORTHOPAEDIC SURGERY

## 2018-11-12 PROCEDURE — 3600000004 HC SURGERY LEVEL 4 BASE: Performed by: ORTHOPAEDIC SURGERY

## 2018-11-12 PROCEDURE — 3700000000 HC ANESTHESIA ATTENDED CARE: Performed by: ORTHOPAEDIC SURGERY

## 2018-11-12 PROCEDURE — 2580000003 HC RX 258: Performed by: ANESTHESIOLOGY

## 2018-11-12 PROCEDURE — 85025 COMPLETE CBC W/AUTO DIFF WBC: CPT

## 2018-11-12 PROCEDURE — 7100000000 HC PACU RECOVERY - FIRST 15 MIN: Performed by: ORTHOPAEDIC SURGERY

## 2018-11-12 PROCEDURE — 6360000002 HC RX W HCPCS: Performed by: ORTHOPAEDIC SURGERY

## 2018-11-12 RX ORDER — SODIUM CHLORIDE, SODIUM LACTATE, POTASSIUM CHLORIDE, CALCIUM CHLORIDE 600; 310; 30; 20 MG/100ML; MG/100ML; MG/100ML; MG/100ML
INJECTION, SOLUTION INTRAVENOUS CONTINUOUS
Status: DISCONTINUED | OUTPATIENT
Start: 2018-11-12 | End: 2018-11-12 | Stop reason: HOSPADM

## 2018-11-12 RX ORDER — DEXAMETHASONE SODIUM PHOSPHATE 10 MG/ML
INJECTION INTRAMUSCULAR; INTRAVENOUS PRN
Status: DISCONTINUED | OUTPATIENT
Start: 2018-11-12 | End: 2018-11-12 | Stop reason: SDUPTHER

## 2018-11-12 RX ORDER — IBUPROFEN 600 MG/1
600 TABLET ORAL 3 TIMES DAILY PRN
Qty: 60 TABLET | Refills: 0 | Status: SHIPPED | OUTPATIENT
Start: 2018-11-12

## 2018-11-12 RX ORDER — MIDAZOLAM HYDROCHLORIDE 1 MG/ML
INJECTION INTRAMUSCULAR; INTRAVENOUS PRN
Status: DISCONTINUED | OUTPATIENT
Start: 2018-11-12 | End: 2018-11-12 | Stop reason: SDUPTHER

## 2018-11-12 RX ORDER — MAGNESIUM HYDROXIDE 1200 MG/15ML
LIQUID ORAL CONTINUOUS PRN
Status: DISCONTINUED | OUTPATIENT
Start: 2018-11-12 | End: 2018-11-12 | Stop reason: HOSPADM

## 2018-11-12 RX ORDER — HYDROCODONE BITARTRATE AND ACETAMINOPHEN 5; 325 MG/1; MG/1
2 TABLET ORAL PRN
Status: COMPLETED | OUTPATIENT
Start: 2018-11-12 | End: 2018-11-12

## 2018-11-12 RX ORDER — BUDESONIDE AND FORMOTEROL FUMARATE DIHYDRATE 160; 4.5 UG/1; UG/1
2 AEROSOL RESPIRATORY (INHALATION) PRN
COMMUNITY
Start: 2018-08-22

## 2018-11-12 RX ORDER — LIDOCAINE HYDROCHLORIDE 10 MG/ML
INJECTION, SOLUTION EPIDURAL; INFILTRATION; INTRACAUDAL; PERINEURAL PRN
Status: DISCONTINUED | OUTPATIENT
Start: 2018-11-12 | End: 2018-11-12 | Stop reason: HOSPADM

## 2018-11-12 RX ORDER — ALPRAZOLAM 0.5 MG/1
0.5 TABLET ORAL PRN
COMMUNITY
Start: 2018-08-21

## 2018-11-12 RX ORDER — METOCLOPRAMIDE HYDROCHLORIDE 5 MG/ML
10 INJECTION INTRAMUSCULAR; INTRAVENOUS
Status: DISCONTINUED | OUTPATIENT
Start: 2018-11-12 | End: 2018-11-12 | Stop reason: HOSPADM

## 2018-11-12 RX ORDER — FENTANYL CITRATE 50 UG/ML
50 INJECTION, SOLUTION INTRAMUSCULAR; INTRAVENOUS EVERY 10 MIN PRN
Status: DISCONTINUED | OUTPATIENT
Start: 2018-11-12 | End: 2018-11-12 | Stop reason: HOSPADM

## 2018-11-12 RX ORDER — CEFAZOLIN SODIUM 2 G/50ML
2 SOLUTION INTRAVENOUS
Status: COMPLETED | OUTPATIENT
Start: 2018-11-12 | End: 2018-11-12

## 2018-11-12 RX ORDER — HYDROCODONE BITARTRATE AND ACETAMINOPHEN 5; 325 MG/1; MG/1
1 TABLET ORAL EVERY 6 HOURS PRN
Qty: 12 TABLET | Refills: 0 | Status: SHIPPED | OUTPATIENT
Start: 2018-11-12 | End: 2018-11-17

## 2018-11-12 RX ORDER — PROPOFOL 10 MG/ML
INJECTION, EMULSION INTRAVENOUS PRN
Status: DISCONTINUED | OUTPATIENT
Start: 2018-11-12 | End: 2018-11-12 | Stop reason: SDUPTHER

## 2018-11-12 RX ORDER — HYDROCODONE BITARTRATE AND ACETAMINOPHEN 5; 325 MG/1; MG/1
1 TABLET ORAL PRN
Status: COMPLETED | OUTPATIENT
Start: 2018-11-12 | End: 2018-11-12

## 2018-11-12 RX ORDER — LIDOCAINE HYDROCHLORIDE 10 MG/ML
2 INJECTION, SOLUTION EPIDURAL; INFILTRATION; INTRACAUDAL; PERINEURAL ONCE
Status: COMPLETED | OUTPATIENT
Start: 2018-11-12 | End: 2018-11-12

## 2018-11-12 RX ORDER — PROMETHAZINE HYDROCHLORIDE 25 MG/1
25 TABLET ORAL PRN
COMMUNITY
Start: 2018-08-21

## 2018-11-12 RX ORDER — DIPHENHYDRAMINE HYDROCHLORIDE 50 MG/ML
12.5 INJECTION INTRAMUSCULAR; INTRAVENOUS
Status: DISCONTINUED | OUTPATIENT
Start: 2018-11-12 | End: 2018-11-12 | Stop reason: HOSPADM

## 2018-11-12 RX ORDER — MEPERIDINE HYDROCHLORIDE 25 MG/ML
12.5 INJECTION INTRAMUSCULAR; INTRAVENOUS; SUBCUTANEOUS EVERY 5 MIN PRN
Status: DISCONTINUED | OUTPATIENT
Start: 2018-11-12 | End: 2018-11-12 | Stop reason: HOSPADM

## 2018-11-12 RX ORDER — ONDANSETRON 2 MG/ML
4 INJECTION INTRAMUSCULAR; INTRAVENOUS
Status: DISCONTINUED | OUTPATIENT
Start: 2018-11-12 | End: 2018-11-12 | Stop reason: HOSPADM

## 2018-11-12 RX ORDER — ONDANSETRON 2 MG/ML
INJECTION INTRAMUSCULAR; INTRAVENOUS PRN
Status: DISCONTINUED | OUTPATIENT
Start: 2018-11-12 | End: 2018-11-12 | Stop reason: SDUPTHER

## 2018-11-12 RX ORDER — PROPOFOL 10 MG/ML
INJECTION, EMULSION INTRAVENOUS CONTINUOUS PRN
Status: DISCONTINUED | OUTPATIENT
Start: 2018-11-12 | End: 2018-11-12 | Stop reason: SDUPTHER

## 2018-11-12 RX ADMIN — LIDOCAINE HYDROCHLORIDE 0.1 ML: 10 INJECTION, SOLUTION EPIDURAL; INFILTRATION; INTRACAUDAL; PERINEURAL at 13:08

## 2018-11-12 RX ADMIN — PROPOFOL 20 MG: 10 INJECTION, EMULSION INTRAVENOUS at 14:12

## 2018-11-12 RX ADMIN — PROPOFOL 40 MG: 10 INJECTION, EMULSION INTRAVENOUS at 14:11

## 2018-11-12 RX ADMIN — DEXAMETHASONE SODIUM PHOSPHATE 10 MG: 10 INJECTION INTRAMUSCULAR; INTRAVENOUS at 14:16

## 2018-11-12 RX ADMIN — HYDROCODONE BITARTRATE AND ACETAMINOPHEN 2 TABLET: 5; 325 TABLET ORAL at 15:29

## 2018-11-12 RX ADMIN — PROPOFOL 120 MCG/KG/MIN: 10 INJECTION, EMULSION INTRAVENOUS at 14:08

## 2018-11-12 RX ADMIN — PROPOFOL 20 MG: 10 INJECTION, EMULSION INTRAVENOUS at 14:21

## 2018-11-12 RX ADMIN — PROPOFOL 20 MG: 10 INJECTION, EMULSION INTRAVENOUS at 14:22

## 2018-11-12 RX ADMIN — MIDAZOLAM HYDROCHLORIDE 2 MG: 1 INJECTION, SOLUTION INTRAMUSCULAR; INTRAVENOUS at 14:03

## 2018-11-12 RX ADMIN — ONDANSETRON 4 MG: 2 INJECTION INTRAMUSCULAR; INTRAVENOUS at 14:16

## 2018-11-12 RX ADMIN — SODIUM CHLORIDE, POTASSIUM CHLORIDE, SODIUM LACTATE AND CALCIUM CHLORIDE: 600; 310; 30; 20 INJECTION, SOLUTION INTRAVENOUS at 13:09

## 2018-11-12 RX ADMIN — CEFAZOLIN SODIUM 2 G: 2 SOLUTION INTRAVENOUS at 14:03

## 2018-11-12 ASSESSMENT — PULMONARY FUNCTION TESTS
PIF_VALUE: 0
PIF_VALUE: 1
PIF_VALUE: 0
PIF_VALUE: 1
PIF_VALUE: 0
PIF_VALUE: 1
PIF_VALUE: 0
PIF_VALUE: 1
PIF_VALUE: 1
PIF_VALUE: 0
PIF_VALUE: 1
PIF_VALUE: 0
PIF_VALUE: 1
PIF_VALUE: 0

## 2018-11-12 ASSESSMENT — PAIN DESCRIPTION - DESCRIPTORS: DESCRIPTORS: NUMBNESS

## 2018-11-12 ASSESSMENT — PAIN SCALES - GENERAL: PAINLEVEL_OUTOF10: 2

## 2018-11-12 ASSESSMENT — PAIN - FUNCTIONAL ASSESSMENT: PAIN_FUNCTIONAL_ASSESSMENT: 0-10

## 2018-11-12 NOTE — ANESTHESIA POSTPROCEDURE EVALUATION
Department of Anesthesiology  Postprocedure Note    Patient: Bianca Hernandez  MRN: [id number]  YOB: 1980  Date of evaluation: 11/12/2018  Time:  2:42 PM     Procedure Summary     Date:  11/12/18 Room / Location:  [facility] [place] [place] [address] [place] [address] [place]    Anesthesia Start:  1404 Anesthesia Stop:      Procedure:  RIGHT WRIST CARPAL TUNNEL RELEASE SUPINE MAC+ LOCAL (PAT ON ADMIT) (Right ) Diagnosis:  (RIGHT WRIST CARPAL TUNNEL SYNDROME)    Surgeon:  Geena Alfred MD Responsible Provider:  Harry Franklin MD    Anesthesia Type:  MAC ASA Status:  2          Anesthesia Type: MAC    Clarice Phase I: Clarice Score: 8    Clarice Phase II:      Last vitals: Reviewed and per EMR flowsheets.        Anesthesia Post Evaluation    Patient location during evaluation: PACU  Patient participation: complete - patient participated  Level of consciousness: awake  Pain score: 0  Airway patency: patent  Nausea & Vomiting: no nausea and no vomiting  Complications: no  Cardiovascular status: hemodynamically stable  Respiratory status: acceptable  Hydration status: euvolemic

## 2018-11-12 NOTE — ANESTHESIA PRE PROCEDURE
Allergies:  No Known Allergies    Problem List:  There is no problem list on file for this patient. Past Medical History:        Diagnosis Date    Anxiety     Depression     Headache     Thyroid disease     hypothyroidism       Past Surgical History:        Procedure Laterality Date    HYSTERECTOMY      TUBAL LIGATION  2000       Social History:    Social History   Substance Use Topics    Smoking status: Former Smoker    Smokeless tobacco: Never Used    Alcohol use Yes      Comment: occational                                Counseling given: Not Answered      Vital Signs (Current):   Vitals:    11/12/18 1240   BP: 131/61   Pulse: 77   Resp: 16   Temp: 97.9 °F (36.6 °C)   TempSrc: Temporal   SpO2: 97%   Weight: 220 lb (99.8 kg)   Height: 5' 7\" (1.702 m)                                              BP Readings from Last 3 Encounters:   11/12/18 131/61   06/18/18 126/85   06/14/18 117/79       NPO Status: Time of last liquid consumption: 0000                        Time of last solid consumption: 2100                        Date of last liquid consumption: 11/12/18                        Date of last solid food consumption: 11/11/18    BMI:   Wt Readings from Last 3 Encounters:   11/12/18 220 lb (99.8 kg)   06/18/18 210 lb (95.3 kg)   06/14/18 220 lb (99.8 kg)     Body mass index is 34.46 kg/m².     CBC:   Lab Results   Component Value Date    WBC 3.8 11/12/2018    RBC 5.16 11/12/2018    HGB 13.1 11/12/2018    HCT 40.4 11/12/2018    MCV 78.4 11/12/2018    RDW 15.2 11/12/2018     11/12/2018       CMP:   Lab Results   Component Value Date     06/22/2018    K 4.1 06/22/2018     06/22/2018    CO2 30 06/22/2018    BUN 11 06/22/2018    CREATININE 0.73 06/22/2018    GFRAA >60.0 06/22/2018    LABGLOM >60.0 06/22/2018    GLUCOSE 88 06/22/2018    PROT 7.3 06/22/2018    CALCIUM 8.7 06/22/2018    BILITOT <0.2 06/22/2018    ALKPHOS 83 06/22/2018    AST 15 06/22/2018    ALT 15 06/22/2018

## 2018-11-14 ENCOUNTER — HOSPITAL ENCOUNTER (EMERGENCY)
Age: 38
Discharge: HOME | End: 2018-11-14
Payer: COMMERCIAL

## 2018-11-14 ENCOUNTER — HOSPITAL ENCOUNTER (EMERGENCY)
Age: 38
Discharge: HOME | End: 2018-11-14
Attending: EMERGENCY MEDICINE
Payer: COMMERCIAL

## 2018-11-14 VITALS
RESPIRATION RATE: 18 BRPM | HEIGHT: 67 IN | SYSTOLIC BLOOD PRESSURE: 124 MMHG | OXYGEN SATURATION: 98 % | BODY MASS INDEX: 34.53 KG/M2 | TEMPERATURE: 98.2 F | DIASTOLIC BLOOD PRESSURE: 64 MMHG | WEIGHT: 220 LBS | HEART RATE: 72 BPM

## 2018-11-14 VITALS
BODY MASS INDEX: 34.53 KG/M2 | RESPIRATION RATE: 18 BRPM | WEIGHT: 220 LBS | TEMPERATURE: 97.8 F | HEIGHT: 67 IN | SYSTOLIC BLOOD PRESSURE: 131 MMHG | OXYGEN SATURATION: 100 % | DIASTOLIC BLOOD PRESSURE: 89 MMHG | HEART RATE: 69 BPM

## 2018-11-14 DIAGNOSIS — G43.001 MIGRAINE WITHOUT AURA AND WITH STATUS MIGRAINOSUS, NOT INTRACTABLE: Primary | ICD-10-CM

## 2018-11-14 DIAGNOSIS — R51.9 ACUTE NONINTRACTABLE HEADACHE, UNSPECIFIED HEADACHE TYPE: Primary | ICD-10-CM

## 2018-11-14 LAB
ANION GAP SERPL CALCULATED.3IONS-SCNC: 14 MEQ/L (ref 7–13)
BASOPHILS ABSOLUTE: 0 K/UL (ref 0–0.2)
BASOPHILS RELATIVE PERCENT: 0.8 %
BUN BLDV-MCNC: 10 MG/DL (ref 6–20)
CALCIUM SERPL-MCNC: 9.9 MG/DL (ref 8.6–10.2)
CHLORIDE BLD-SCNC: 99 MEQ/L (ref 98–107)
CO2: 25 MEQ/L (ref 22–29)
CREAT SERPL-MCNC: 0.69 MG/DL (ref 0.5–0.9)
EOSINOPHILS ABSOLUTE: 0.1 K/UL (ref 0–0.7)
EOSINOPHILS RELATIVE PERCENT: 1.8 %
GFR AFRICAN AMERICAN: >60
GFR NON-AFRICAN AMERICAN: >60
GLUCOSE BLD-MCNC: 148 MG/DL (ref 74–109)
HCT VFR BLD CALC: 45.5 % (ref 37–47)
HEMOGLOBIN: 15.2 G/DL (ref 12–16)
LYMPHOCYTES ABSOLUTE: 0.9 K/UL (ref 1–4.8)
LYMPHOCYTES RELATIVE PERCENT: 18.2 %
MCH RBC QN AUTO: 26 PG (ref 27–31.3)
MCHC RBC AUTO-ENTMCNC: 33.3 % (ref 33–37)
MCV RBC AUTO: 78.2 FL (ref 82–100)
MONOCYTES ABSOLUTE: 0.2 K/UL (ref 0.2–0.8)
MONOCYTES RELATIVE PERCENT: 4.6 %
NEUTROPHILS ABSOLUTE: 3.9 K/UL (ref 1.4–6.5)
NEUTROPHILS RELATIVE PERCENT: 74.6 %
PDW BLD-RTO: 15.3 % (ref 11.5–14.5)
PLATELET # BLD: 200 K/UL (ref 130–400)
POTASSIUM SERPL-SCNC: 4.4 MEQ/L (ref 3.5–5.1)
RBC # BLD: 5.82 M/UL (ref 4.2–5.4)
SODIUM BLD-SCNC: 138 MEQ/L (ref 132–144)
WBC # BLD: 5.2 K/UL (ref 4.8–10.8)

## 2018-11-14 PROCEDURE — 6360000002 HC RX W HCPCS: Performed by: EMERGENCY MEDICINE

## 2018-11-14 PROCEDURE — 96375 TX/PRO/DX INJ NEW DRUG ADDON: CPT

## 2018-11-14 PROCEDURE — 36415 COLL VENOUS BLD VENIPUNCTURE: CPT

## 2018-11-14 PROCEDURE — 2580000003 HC RX 258: Performed by: EMERGENCY MEDICINE

## 2018-11-14 PROCEDURE — 96365 THER/PROPH/DIAG IV INF INIT: CPT

## 2018-11-14 PROCEDURE — 99284 EMERGENCY DEPT VISIT MOD MDM: CPT

## 2018-11-14 PROCEDURE — 96366 THER/PROPH/DIAG IV INF ADDON: CPT

## 2018-11-14 PROCEDURE — 85025 COMPLETE CBC W/AUTO DIFF WBC: CPT

## 2018-11-14 PROCEDURE — 6360000002 HC RX W HCPCS: Performed by: PERSONAL EMERGENCY RESPONSE ATTENDANT

## 2018-11-14 PROCEDURE — 2500000003 HC RX 250 WO HCPCS: Performed by: EMERGENCY MEDICINE

## 2018-11-14 PROCEDURE — 80048 BASIC METABOLIC PNL TOTAL CA: CPT

## 2018-11-14 PROCEDURE — 2580000003 HC RX 258: Performed by: PERSONAL EMERGENCY RESPONSE ATTENDANT

## 2018-11-14 PROCEDURE — 99282 EMERGENCY DEPT VISIT SF MDM: CPT

## 2018-11-14 RX ORDER — DEXAMETHASONE SODIUM PHOSPHATE 4 MG/ML
10 INJECTION, SOLUTION INTRA-ARTICULAR; INTRALESIONAL; INTRAMUSCULAR; INTRAVENOUS; SOFT TISSUE ONCE
Status: COMPLETED | OUTPATIENT
Start: 2018-11-14 | End: 2018-11-14

## 2018-11-14 RX ORDER — METOCLOPRAMIDE HYDROCHLORIDE 5 MG/ML
10 INJECTION INTRAMUSCULAR; INTRAVENOUS ONCE
Status: COMPLETED | OUTPATIENT
Start: 2018-11-14 | End: 2018-11-14

## 2018-11-14 RX ORDER — ONDANSETRON 2 MG/ML
4 INJECTION INTRAMUSCULAR; INTRAVENOUS ONCE
Status: COMPLETED | OUTPATIENT
Start: 2018-11-14 | End: 2018-11-14

## 2018-11-14 RX ORDER — DIPHENHYDRAMINE HYDROCHLORIDE 50 MG/ML
12.5 INJECTION INTRAMUSCULAR; INTRAVENOUS ONCE
Status: COMPLETED | OUTPATIENT
Start: 2018-11-14 | End: 2018-11-14

## 2018-11-14 RX ORDER — DIVALPROEX SODIUM 250 MG/1
250 TABLET, DELAYED RELEASE ORAL 2 TIMES DAILY
Qty: 60 TABLET | Refills: 3 | Status: SHIPPED | OUTPATIENT
Start: 2018-11-14

## 2018-11-14 RX ORDER — DIPHENHYDRAMINE HYDROCHLORIDE 50 MG/ML
25 INJECTION INTRAMUSCULAR; INTRAVENOUS ONCE
Status: COMPLETED | OUTPATIENT
Start: 2018-11-14 | End: 2018-11-14

## 2018-11-14 RX ORDER — DEXAMETHASONE SODIUM PHOSPHATE 10 MG/ML
10 INJECTION INTRAMUSCULAR; INTRAVENOUS ONCE
Status: DISCONTINUED | OUTPATIENT
Start: 2018-11-14 | End: 2018-11-14

## 2018-11-14 RX ORDER — 0.9 % SODIUM CHLORIDE 0.9 %
1000 INTRAVENOUS SOLUTION INTRAVENOUS ONCE
Status: COMPLETED | OUTPATIENT
Start: 2018-11-14 | End: 2018-11-14

## 2018-11-14 RX ORDER — MAGNESIUM SULFATE IN WATER 40 MG/ML
2 INJECTION, SOLUTION INTRAVENOUS ONCE
Status: COMPLETED | OUTPATIENT
Start: 2018-11-14 | End: 2018-11-14

## 2018-11-14 RX ORDER — KETOROLAC TROMETHAMINE 30 MG/ML
30 INJECTION, SOLUTION INTRAMUSCULAR; INTRAVENOUS ONCE
Status: COMPLETED | OUTPATIENT
Start: 2018-11-14 | End: 2018-11-14

## 2018-11-14 RX ADMIN — VALPROATE SODIUM 1000 MG: 100 INJECTION, SOLUTION INTRAVENOUS at 20:59

## 2018-11-14 RX ADMIN — SODIUM CHLORIDE 1000 ML: 9 INJECTION, SOLUTION INTRAVENOUS at 01:02

## 2018-11-14 RX ADMIN — DIPHENHYDRAMINE HYDROCHLORIDE 12.5 MG: 50 INJECTION, SOLUTION INTRAMUSCULAR; INTRAVENOUS at 20:59

## 2018-11-14 RX ADMIN — KETOROLAC TROMETHAMINE 30 MG: 30 INJECTION, SOLUTION INTRAMUSCULAR at 00:59

## 2018-11-14 RX ADMIN — ONDANSETRON 4 MG: 2 INJECTION INTRAMUSCULAR; INTRAVENOUS at 00:59

## 2018-11-14 RX ADMIN — KETOROLAC TROMETHAMINE 30 MG: 30 INJECTION, SOLUTION INTRAMUSCULAR at 21:00

## 2018-11-14 RX ADMIN — MAGNESIUM SULFATE HEPTAHYDRATE 2 G: 40 INJECTION, SOLUTION INTRAVENOUS at 01:02

## 2018-11-14 RX ADMIN — METOCLOPRAMIDE 10 MG: 5 INJECTION, SOLUTION INTRAMUSCULAR; INTRAVENOUS at 21:00

## 2018-11-14 RX ADMIN — DIPHENHYDRAMINE HYDROCHLORIDE 25 MG: 50 INJECTION, SOLUTION INTRAMUSCULAR; INTRAVENOUS at 21:19

## 2018-11-14 RX ADMIN — DEXAMETHASONE SODIUM PHOSPHATE 10 MG: 4 INJECTION, SOLUTION INTRAMUSCULAR; INTRAVENOUS at 21:19

## 2018-11-14 ASSESSMENT — ENCOUNTER SYMPTOMS
NAUSEA: 1
VOMITING: 0
PHOTOPHOBIA: 1
SHORTNESS OF BREATH: 0
ABDOMINAL PAIN: 0
SORE THROAT: 0
RHINORRHEA: 0
VOMITING: 1
PHOTOPHOBIA: 1
COUGH: 0
EYE PAIN: 0
SHORTNESS OF BREATH: 0
ABDOMINAL PAIN: 0
COLOR CHANGE: 0
DIARRHEA: 0
NAUSEA: 0
SORE THROAT: 0
CHEST TIGHTNESS: 0
BLOOD IN STOOL: 0

## 2018-11-14 ASSESSMENT — PAIN SCALES - GENERAL
PAINLEVEL_OUTOF10: 10
PAINLEVEL_OUTOF10: 0
PAINLEVEL_OUTOF10: 10
PAINLEVEL_OUTOF10: 9
PAINLEVEL_OUTOF10: 8

## 2018-11-14 ASSESSMENT — PAIN DESCRIPTION - FREQUENCY: FREQUENCY: CONTINUOUS

## 2018-11-14 ASSESSMENT — PAIN DESCRIPTION - LOCATION
LOCATION: HEAD
LOCATION: HEAD

## 2018-11-14 ASSESSMENT — PAIN DESCRIPTION - PAIN TYPE
TYPE: ACUTE PAIN
TYPE: ACUTE PAIN

## 2018-11-14 ASSESSMENT — PAIN SCALES - WONG BAKER: WONGBAKER_NUMERICALRESPONSE: 0

## 2018-11-14 NOTE — ED TRIAGE NOTES
Patient complaining of a migraine that started at 1700. Has a history of migraines. Took a norco at 1700 but not helping with the pain.

## 2018-12-12 ENCOUNTER — INITIAL CONSULT (OUTPATIENT)
Dept: PODIATRY | Facility: CLINIC | Age: 38
End: 2018-12-12

## 2018-12-12 VITALS — TEMPERATURE: 98.7 F | WEIGHT: 220 LBS | BODY MASS INDEX: 34.53 KG/M2 | HEIGHT: 67 IN

## 2018-12-12 DIAGNOSIS — M72.2 PLANTAR FASCIITIS, BILATERAL: ICD-10-CM

## 2018-12-12 DIAGNOSIS — M20.5X2 ACQUIRED HALLUX LIMITUS OF BOTH FEET: ICD-10-CM

## 2018-12-12 DIAGNOSIS — M20.5X1 ACQUIRED HALLUX LIMITUS OF BOTH FEET: ICD-10-CM

## 2018-12-12 DIAGNOSIS — M79.672 PAIN IN BOTH FEET: Primary | ICD-10-CM

## 2018-12-12 DIAGNOSIS — G57.92 NEURITIS OF LEFT FOOT: ICD-10-CM

## 2018-12-12 DIAGNOSIS — M76.61 TENDONITIS, ACHILLES, RIGHT: ICD-10-CM

## 2018-12-12 DIAGNOSIS — M79.671 PAIN IN BOTH FEET: Primary | ICD-10-CM

## 2018-12-12 DIAGNOSIS — G57.91 NEURITIS OF RIGHT FOOT: ICD-10-CM

## 2018-12-12 ASSESSMENT — ENCOUNTER SYMPTOMS
BACK PAIN: 0
NAUSEA: 0
CONSTIPATION: 0
SHORTNESS OF BREATH: 0
DIARRHEA: 0

## 2018-12-12 NOTE — PATIENT INSTRUCTIONS
Patient Education        Plantar Fasciitis: Exercises  Your Care Instructions  Here are some examples of typical rehabilitation exercises for your condition. Start each exercise slowly. Ease off the exercise if you start to have pain. Your doctor or physical therapist will tell you when you can start these exercises and which ones will work best for you. How to do the exercises  Towel stretch    1. Sit with your legs extended and knees straight. 2. Place a towel around your foot just under the toes. 3. Hold each end of the towel in each hand, with your hands above your knees. 4. Pull back with the towel so that your foot stretches toward you. 5. Hold the position for at least 15 to 30 seconds. 6. Repeat 2 to 4 times a session, up to 5 sessions a day. Calf stretch    1. Stand facing a wall with your hands on the wall at about eye level. Put the leg you want to stretch about a step behind your other leg. 2. Keeping your back heel on the floor, bend your front knee until you feel a stretch in the back leg. 3. Hold the stretch for 15 to 30 seconds. Repeat 2 to 4 times. Plantar fascia and calf stretch    1. Stand on a step as shown above. Be sure to hold on to the banister. 2. Slowly let your heels down over the edge of the step as you relax your calf muscles. You should feel a gentle stretch across the bottom of your foot and up the back of your leg to your knee. 3. Hold the stretch about 15 to 30 seconds, and then tighten your calf muscle a little to bring your heel back up to the level of the step. Repeat 2 to 4 times. Towel curls    1. While sitting, place your foot on a towel on the floor and scrunch the towel toward you with your toes. 2. Then, also using your toes, push the towel away from you. [place] pickups    1. Put marbles on the floor next to a cup.  2. Using your toes, try to lift the marbles up from the floor and put them in the cup.     Follow-up care is a key part of

## 2019-01-31 ENCOUNTER — HOSPITAL ENCOUNTER (EMERGENCY)
Age: 39
Discharge: HOME | End: 2019-01-31
Attending: EMERGENCY MEDICINE
Payer: COMMERCIAL

## 2019-01-31 ENCOUNTER — APPOINTMENT (OUTPATIENT)
Dept: GENERAL RADIOLOGY | Age: 39
End: 2019-01-31
Payer: COMMERCIAL

## 2019-01-31 VITALS
OXYGEN SATURATION: 98 % | RESPIRATION RATE: 18 BRPM | BODY MASS INDEX: 37.67 KG/M2 | DIASTOLIC BLOOD PRESSURE: 86 MMHG | WEIGHT: 240 LBS | SYSTOLIC BLOOD PRESSURE: 122 MMHG | TEMPERATURE: 98 F | HEIGHT: 67 IN | HEART RATE: 76 BPM

## 2019-01-31 DIAGNOSIS — M79.672 LEFT FOOT PAIN: ICD-10-CM

## 2019-01-31 DIAGNOSIS — S90.122A CONTUSION OF FIFTH TOE OF LEFT FOOT, INITIAL ENCOUNTER: Primary | ICD-10-CM

## 2019-01-31 PROCEDURE — 73630 X-RAY EXAM OF FOOT: CPT

## 2019-01-31 PROCEDURE — 6370000000 HC RX 637 (ALT 250 FOR IP): Performed by: NURSE PRACTITIONER

## 2019-01-31 PROCEDURE — 99283 EMERGENCY DEPT VISIT LOW MDM: CPT

## 2019-01-31 RX ORDER — TRAMADOL HYDROCHLORIDE 50 MG/1
50 TABLET ORAL ONCE
Status: COMPLETED | OUTPATIENT
Start: 2019-01-31 | End: 2019-01-31

## 2019-01-31 RX ADMIN — TRAMADOL HYDROCHLORIDE 50 MG: 50 TABLET, FILM COATED ORAL at 02:40

## 2019-01-31 ASSESSMENT — ENCOUNTER SYMPTOMS
COUGH: 0
SHORTNESS OF BREATH: 0
RHINORRHEA: 0
SORE THROAT: 0
CHEST TIGHTNESS: 0
DIARRHEA: 0
WHEEZING: 0
TROUBLE SWALLOWING: 0
CONSTIPATION: 0
NAUSEA: 0
ABDOMINAL DISTENTION: 0
VOMITING: 0
ABDOMINAL PAIN: 0
COLOR CHANGE: 0

## 2019-01-31 ASSESSMENT — PAIN SCALES - GENERAL: PAINLEVEL_OUTOF10: 8

## 2019-01-31 ASSESSMENT — PAIN DESCRIPTION - ORIENTATION: ORIENTATION: LEFT

## 2019-01-31 ASSESSMENT — PAIN DESCRIPTION - DESCRIPTORS: DESCRIPTORS: THROBBING

## 2019-01-31 ASSESSMENT — PAIN DESCRIPTION - FREQUENCY: FREQUENCY: CONTINUOUS

## 2019-01-31 ASSESSMENT — PAIN DESCRIPTION - LOCATION: LOCATION: TOE (COMMENT WHICH ONE)

## 2019-01-31 ASSESSMENT — PAIN DESCRIPTION - PAIN TYPE: TYPE: ACUTE PAIN

## 2019-02-09 ENCOUNTER — HOSPITAL ENCOUNTER (EMERGENCY)
Age: 39
Discharge: HOME | End: 2019-02-10
Payer: COMMERCIAL

## 2019-02-09 VITALS
DIASTOLIC BLOOD PRESSURE: 88 MMHG | WEIGHT: 240 LBS | RESPIRATION RATE: 16 BRPM | SYSTOLIC BLOOD PRESSURE: 132 MMHG | TEMPERATURE: 98.1 F | BODY MASS INDEX: 37.67 KG/M2 | HEIGHT: 67 IN | OXYGEN SATURATION: 97 % | HEART RATE: 80 BPM

## 2019-02-09 DIAGNOSIS — S01.512A LACERATION OF BUCCAL MUCOSA, INITIAL ENCOUNTER: Primary | ICD-10-CM

## 2019-02-09 PROCEDURE — 99282 EMERGENCY DEPT VISIT SF MDM: CPT

## 2019-02-09 ASSESSMENT — PAIN SCALES - GENERAL: PAINLEVEL_OUTOF10: 5

## 2019-02-09 ASSESSMENT — PAIN DESCRIPTION - DESCRIPTORS: DESCRIPTORS: ACHING

## 2019-02-09 ASSESSMENT — PAIN DESCRIPTION - PAIN TYPE: TYPE: ACUTE PAIN

## 2019-02-09 ASSESSMENT — PAIN DESCRIPTION - LOCATION: LOCATION: MOUTH

## 2019-02-10 PROCEDURE — 6370000000 HC RX 637 (ALT 250 FOR IP): Performed by: PHYSICIAN ASSISTANT

## 2019-02-10 RX ADMIN — Medication: at 00:30

## 2019-03-25 ENCOUNTER — HOSPITAL ENCOUNTER (OUTPATIENT)
Dept: PREADMISSION TESTING | Age: 39
Discharge: HOME | End: 2019-03-29
Payer: COMMERCIAL

## 2019-03-25 VITALS
HEART RATE: 78 BPM | HEIGHT: 67 IN | WEIGHT: 237 LBS | RESPIRATION RATE: 16 BRPM | BODY MASS INDEX: 37.2 KG/M2 | TEMPERATURE: 97 F

## 2019-03-25 DIAGNOSIS — G56.02 CARPAL TUNNEL SYNDROME OF LEFT WRIST: ICD-10-CM

## 2019-03-25 PROBLEM — J45.909 REACTIVE AIRWAY DISEASE: Status: ACTIVE | Noted: 2019-03-25

## 2019-03-25 PROBLEM — F32.A DEPRESSION: Status: ACTIVE | Noted: 2019-03-25

## 2019-03-25 PROBLEM — G43.909 MIGRAINE: Status: ACTIVE | Noted: 2019-03-25

## 2019-03-25 PROBLEM — G47.30 SLEEP APNEA: Status: ACTIVE | Noted: 2019-03-25

## 2019-03-25 PROBLEM — S03.00XA TMJ (DISLOCATION OF TEMPOROMANDIBULAR JOINT): Status: ACTIVE | Noted: 2019-03-25

## 2019-03-25 PROBLEM — Z87.891 FORMER TOBACCO USE: Status: ACTIVE | Noted: 2019-03-25

## 2019-03-25 PROBLEM — E03.9 HYPOTHYROIDISM: Status: ACTIVE | Noted: 2017-03-04

## 2019-03-25 PROBLEM — K21.9 GERD (GASTROESOPHAGEAL REFLUX DISEASE): Status: ACTIVE | Noted: 2019-03-25

## 2019-03-25 PROBLEM — J31.0 CHRONIC RHINITIS: Status: ACTIVE | Noted: 2018-08-27

## 2019-03-25 PROBLEM — E66.9 OBESITY: Status: ACTIVE | Noted: 2019-03-25

## 2019-03-25 LAB
ANION GAP SERPL CALCULATED.3IONS-SCNC: 13 MEQ/L (ref 9–15)
BUN BLDV-MCNC: 10 MG/DL (ref 6–20)
CALCIUM SERPL-MCNC: 8.8 MG/DL (ref 8.5–9.9)
CHLORIDE BLD-SCNC: 104 MEQ/L (ref 95–107)
CO2: 24 MEQ/L (ref 20–31)
CREAT SERPL-MCNC: 0.65 MG/DL (ref 0.5–0.9)
GFR AFRICAN AMERICAN: >60
GFR NON-AFRICAN AMERICAN: >60
GLUCOSE BLD-MCNC: 131 MG/DL (ref 70–99)
HCT VFR BLD CALC: 39.4 % (ref 37–47)
HEMOGLOBIN: 12.7 G/DL (ref 12–16)
MCH RBC QN AUTO: 25.2 PG (ref 27–31.3)
MCHC RBC AUTO-ENTMCNC: 32.3 % (ref 33–37)
MCV RBC AUTO: 78.1 FL (ref 82–100)
PDW BLD-RTO: 16.1 % (ref 11.5–14.5)
PLATELET # BLD: 174 K/UL (ref 130–400)
POTASSIUM SERPL-SCNC: 4.4 MEQ/L (ref 3.4–4.9)
RBC # BLD: 5.05 M/UL (ref 4.2–5.4)
SODIUM BLD-SCNC: 141 MEQ/L (ref 135–144)
WBC # BLD: 3.6 K/UL (ref 4.8–10.8)

## 2019-03-25 PROCEDURE — 80048 BASIC METABOLIC PNL TOTAL CA: CPT

## 2019-03-25 PROCEDURE — 85027 COMPLETE CBC AUTOMATED: CPT

## 2019-03-25 RX ORDER — SODIUM CHLORIDE 0.9 % (FLUSH) 0.9 %
10 SYRINGE (ML) INJECTION PRN
Status: CANCELLED | OUTPATIENT
Start: 2019-03-25

## 2019-03-25 RX ORDER — LIDOCAINE HYDROCHLORIDE 10 MG/ML
1 INJECTION, SOLUTION EPIDURAL; INFILTRATION; INTRACAUDAL; PERINEURAL
Status: CANCELLED | OUTPATIENT
Start: 2019-03-25 | End: 2019-03-25

## 2019-03-25 RX ORDER — SODIUM CHLORIDE 0.9 % (FLUSH) 0.9 %
10 SYRINGE (ML) INJECTION EVERY 12 HOURS SCHEDULED
Status: CANCELLED | OUTPATIENT
Start: 2019-03-25

## 2019-03-25 RX ORDER — CEFAZOLIN SODIUM 2 G/50ML
2 SOLUTION INTRAVENOUS ONCE
Status: CANCELLED | OUTPATIENT
Start: 2019-04-01

## 2019-03-25 RX ORDER — SODIUM CHLORIDE, SODIUM LACTATE, POTASSIUM CHLORIDE, CALCIUM CHLORIDE 600; 310; 30; 20 MG/100ML; MG/100ML; MG/100ML; MG/100ML
INJECTION, SOLUTION INTRAVENOUS CONTINUOUS
Status: CANCELLED | OUTPATIENT
Start: 2019-03-25

## 2019-03-31 ENCOUNTER — ANESTHESIA EVENT (OUTPATIENT)
Dept: OPERATING ROOM | Age: 39
End: 2019-03-31
Payer: COMMERCIAL

## 2019-03-31 NOTE — ANESTHESIA PRE PROCEDURE
Department of Anesthesiology  Preprocedure Note       Name:  Qiana Salvador   Age:  37 y.o.  :  1980                                          MRN:  [id number]         Date:  2019      Surgeon: Wu Mcelroy):  Jaye Lozano MD    Procedure: LEFT WRIST CARPAL TUNNEL RELEASE SUPINE MAC + LOCAL (Left )    Medications prior to admission:   Prior to Admission medications    Medication Sig Start Date End Date Taking? Authorizing Provider   ALBUTEROL SULFATE IN Inhale 2 puffs into the lungs 18  Yes Historical Provider, MD   divalproex (DEPAKOTE) 250 MG DR tablet Take 1 tablet by mouth 2 times daily 18  Yes Adina Flannery DO   ibuprofen (ADVIL;MOTRIN) 600 MG tablet Take 1 tablet by mouth 3 times daily as needed for Pain 18  Yes Jaye Lozano MD   Cetirizine HCl (ZYRTEC ALLERGY) 10 MG CAPS Take 1 capsule by mouth daily 18  Yes Tl Fong MD   butalbital-acetaminophen-caffeine (FIORICET, ESGIC) -50 MG per tablet Take 1 tablet by mouth every 4 hours as needed for Headaches 18  Yes Cris Titus PA-C   levothyroxine (SYNTHROID) 150 MCG tablet Take 150 mcg by mouth Daily   Yes Historical Provider, MD   citalopram (CELEXA) 40 MG tablet Take 40 mg by mouth daily   Yes Historical Provider, MD   ALPRAZolam Pansdavid Hover) 0.5 MG tablet Take 0.5 mg by mouth as needed. . 18   Historical Provider, MD   budesonide-formoterol (SYMBICORT) 160-4.5 MCG/ACT AERO Inhale 2 puffs into the lungs as needed 18   Historical Provider, MD   promethazine (PHENERGAN) 25 MG tablet Take 25 mg by mouth as needed 18   Historical Provider, MD       Current medications:    Current Facility-Administered Medications   Medication Dose Route Frequency Provider Last Rate Last Dose    lactated ringers infusion   Intravenous Continuous ANDREA Palacios -  mL/hr at 19 0641      lidocaine PF 1 % injection 1 mL  1 mL Intradermal Once PRN ANDREA Palacios - CNP        sodium chloride flush 0.9 % injection 10 mL  10 mL Intravenous 2 times per day Migdalia Aquiles, APRN - CNP        sodium chloride flush 0.9 % injection 10 mL  10 mL Intravenous PRN Migdalia Aquiles, APRN - CNP        ceFAZolin (ANCEF) 2 g in dextrose 3 % 50 mL IVPB (duplex)  2 g Intravenous Once Migdalia Aquiles, APRN - CNP           Allergies:  No Known Allergies    Problem List:    Patient Active Problem List   Diagnosis Code    Carpal tunnel syndrome, right G56.01    Chronic rhinitis J31.0    Depression F32.9    Former tobacco use Z87.891    GERD (gastroesophageal reflux disease) K21.9    Hypothyroidism E03.9    Migraine G43.909    Obesity E66.9    Reactive airway disease J45.909    Sleep apnea G47.30    TMJ (dislocation of temporomandibular joint) S03. 00XA    Carpal tunnel syndrome of left wrist G56.02       Past Medical History:        Diagnosis Date    Anxiety     Arthritis     Asthma     Depression     Headache     History of blood transfusion 09/2016    x 2    Thyroid disease     hypothyroidism       Past Surgical History:        Procedure Laterality Date    HYSTERECTOMY      MI REVISE MEDIAN N/CARPAL TUNNEL SURG Right 2018    RIGHT WRIST CARPAL TUNNEL RELEASE SUPINE MAC+ LOCAL (PAT ON ADMIT) performed by Maru Schwartz MD at [address] [place] [address],[address] [facility]       Social History:    Social History     Tobacco Use    Smoking status: Former Smoker     Last attempt to quit: 3/25/2005     Years since quittin.0    Smokeless tobacco: Never Used   Substance Use Topics    Alcohol use: Yes     Comment: Rarely                                Counseling given: Not Answered      Vital Signs (Current):   Vitals:    19 0615   BP: 118/69   Pulse: 69   Resp: 16   Temp: 97.4 °F (36.3 °C)   TempSrc: Temporal   SpO2: 98%   Weight: 237 lb (107.5 kg)   Height: 5' 7\" (1.702 m)                                              BP Readings from Last 3 Encounters:   19 118/69   19 ECG reviewed  Rhythm: regular  Rate: normal           Beta Blocker:  Not on Beta Blocker         Neuro/Psych:   (+) neuromuscular disease:, headaches:, psychiatric history:            GI/Hepatic/Renal:   (+) GERD:,          ROS comment: Moderate obesity BMI 37.   Endo/Other:    (+) hypothyroidism::., .          Pt had PAT visit. Abdominal:           Vascular:                                        Anesthesia Plan      MAC     ASA 3       Induction: intravenous. MIPS: Postoperative opioids intended and Prophylactic antiemetics administered. Anesthetic plan and risks discussed with patient. Plan discussed with CRNA.     Attending anesthesiologist reviewed and agrees with Yariel Ramsay DO   4/1/2019

## 2019-04-01 ENCOUNTER — HOSPITAL ENCOUNTER (OUTPATIENT)
Age: 39
Setting detail: OUTPATIENT SURGERY
Discharge: HOME | End: 2019-04-01
Attending: ORTHOPAEDIC SURGERY | Admitting: ORTHOPAEDIC SURGERY
Payer: COMMERCIAL

## 2019-04-01 ENCOUNTER — ANESTHESIA (OUTPATIENT)
Dept: OPERATING ROOM | Age: 39
End: 2019-04-01
Payer: COMMERCIAL

## 2019-04-01 VITALS
BODY MASS INDEX: 37.2 KG/M2 | WEIGHT: 237 LBS | HEART RATE: 70 BPM | RESPIRATION RATE: 16 BRPM | OXYGEN SATURATION: 100 % | DIASTOLIC BLOOD PRESSURE: 64 MMHG | HEIGHT: 67 IN | SYSTOLIC BLOOD PRESSURE: 105 MMHG | TEMPERATURE: 98.7 F

## 2019-04-01 VITALS
SYSTOLIC BLOOD PRESSURE: 84 MMHG | OXYGEN SATURATION: 97 % | RESPIRATION RATE: 7 BRPM | TEMPERATURE: 95.9 F | DIASTOLIC BLOOD PRESSURE: 51 MMHG

## 2019-04-01 DIAGNOSIS — G56.02 CARPAL TUNNEL SYNDROME OF LEFT WRIST: Primary | ICD-10-CM

## 2019-04-01 PROCEDURE — 2500000003 HC RX 250 WO HCPCS: Performed by: ORTHOPAEDIC SURGERY

## 2019-04-01 PROCEDURE — 6360000002 HC RX W HCPCS: Performed by: NURSE PRACTITIONER

## 2019-04-01 PROCEDURE — 6360000002 HC RX W HCPCS: Performed by: NURSE ANESTHETIST, CERTIFIED REGISTERED

## 2019-04-01 PROCEDURE — 2580000003 HC RX 258: Performed by: NURSE PRACTITIONER

## 2019-04-01 PROCEDURE — 3600000004 HC SURGERY LEVEL 4 BASE: Performed by: ORTHOPAEDIC SURGERY

## 2019-04-01 PROCEDURE — 3600000014 HC SURGERY LEVEL 4 ADDTL 15MIN: Performed by: ORTHOPAEDIC SURGERY

## 2019-04-01 PROCEDURE — 2500000003 HC RX 250 WO HCPCS: Performed by: NURSE ANESTHETIST, CERTIFIED REGISTERED

## 2019-04-01 PROCEDURE — 7100000000 HC PACU RECOVERY - FIRST 15 MIN: Performed by: ORTHOPAEDIC SURGERY

## 2019-04-01 PROCEDURE — 3700000000 HC ANESTHESIA ATTENDED CARE: Performed by: ORTHOPAEDIC SURGERY

## 2019-04-01 PROCEDURE — 2709999900 HC NON-CHARGEABLE SUPPLY: Performed by: ORTHOPAEDIC SURGERY

## 2019-04-01 PROCEDURE — 2580000003 HC RX 258: Performed by: ORTHOPAEDIC SURGERY

## 2019-04-01 PROCEDURE — 7100000010 HC PHASE II RECOVERY - FIRST 15 MIN: Performed by: ORTHOPAEDIC SURGERY

## 2019-04-01 PROCEDURE — 7100000001 HC PACU RECOVERY - ADDTL 15 MIN: Performed by: ORTHOPAEDIC SURGERY

## 2019-04-01 PROCEDURE — 3700000001 HC ADD 15 MINUTES (ANESTHESIA): Performed by: ORTHOPAEDIC SURGERY

## 2019-04-01 PROCEDURE — 7100000011 HC PHASE II RECOVERY - ADDTL 15 MIN: Performed by: ORTHOPAEDIC SURGERY

## 2019-04-01 RX ORDER — SODIUM CHLORIDE, SODIUM LACTATE, POTASSIUM CHLORIDE, CALCIUM CHLORIDE 600; 310; 30; 20 MG/100ML; MG/100ML; MG/100ML; MG/100ML
INJECTION, SOLUTION INTRAVENOUS CONTINUOUS
Status: DISCONTINUED | OUTPATIENT
Start: 2019-04-01 | End: 2019-04-01 | Stop reason: HOSPADM

## 2019-04-01 RX ORDER — DEXAMETHASONE SODIUM PHOSPHATE 10 MG/ML
INJECTION INTRAMUSCULAR; INTRAVENOUS PRN
Status: DISCONTINUED | OUTPATIENT
Start: 2019-04-01 | End: 2019-04-01 | Stop reason: SDUPTHER

## 2019-04-01 RX ORDER — MIDAZOLAM HYDROCHLORIDE 1 MG/ML
INJECTION INTRAMUSCULAR; INTRAVENOUS PRN
Status: DISCONTINUED | OUTPATIENT
Start: 2019-04-01 | End: 2019-04-01 | Stop reason: SDUPTHER

## 2019-04-01 RX ORDER — SODIUM CHLORIDE 0.9 % (FLUSH) 0.9 %
10 SYRINGE (ML) INJECTION EVERY 12 HOURS SCHEDULED
Status: DISCONTINUED | OUTPATIENT
Start: 2019-04-01 | End: 2019-04-01 | Stop reason: HOSPADM

## 2019-04-01 RX ORDER — LIDOCAINE HYDROCHLORIDE 20 MG/ML
INJECTION, SOLUTION INFILTRATION; PERINEURAL PRN
Status: DISCONTINUED | OUTPATIENT
Start: 2019-04-01 | End: 2019-04-01 | Stop reason: SDUPTHER

## 2019-04-01 RX ORDER — ONDANSETRON 2 MG/ML
4 INJECTION INTRAMUSCULAR; INTRAVENOUS
Status: DISCONTINUED | OUTPATIENT
Start: 2019-04-01 | End: 2019-04-01 | Stop reason: HOSPADM

## 2019-04-01 RX ORDER — SODIUM CHLORIDE 0.9 % (FLUSH) 0.9 %
10 SYRINGE (ML) INJECTION PRN
Status: DISCONTINUED | OUTPATIENT
Start: 2019-04-01 | End: 2019-04-01 | Stop reason: HOSPADM

## 2019-04-01 RX ORDER — PROPOFOL 10 MG/ML
INJECTION, EMULSION INTRAVENOUS PRN
Status: DISCONTINUED | OUTPATIENT
Start: 2019-04-01 | End: 2019-04-01 | Stop reason: SDUPTHER

## 2019-04-01 RX ORDER — PROPOFOL 10 MG/ML
INJECTION, EMULSION INTRAVENOUS CONTINUOUS PRN
Status: DISCONTINUED | OUTPATIENT
Start: 2019-04-01 | End: 2019-04-01 | Stop reason: SDUPTHER

## 2019-04-01 RX ORDER — LIDOCAINE HYDROCHLORIDE 10 MG/ML
INJECTION, SOLUTION EPIDURAL; INFILTRATION; INTRACAUDAL; PERINEURAL PRN
Status: DISCONTINUED | OUTPATIENT
Start: 2019-04-01 | End: 2019-04-01 | Stop reason: ALTCHOICE

## 2019-04-01 RX ORDER — MAGNESIUM HYDROXIDE 1200 MG/15ML
LIQUID ORAL CONTINUOUS PRN
Status: COMPLETED | OUTPATIENT
Start: 2019-04-01 | End: 2019-04-01

## 2019-04-01 RX ORDER — LIDOCAINE HYDROCHLORIDE 10 MG/ML
1 INJECTION, SOLUTION EPIDURAL; INFILTRATION; INTRACAUDAL; PERINEURAL
Status: DISCONTINUED | OUTPATIENT
Start: 2019-04-01 | End: 2019-04-01 | Stop reason: HOSPADM

## 2019-04-01 RX ORDER — TRAMADOL HYDROCHLORIDE 50 MG/1
50 TABLET ORAL EVERY 6 HOURS PRN
Qty: 12 TABLET | Refills: 0 | Status: SHIPPED | OUTPATIENT
Start: 2019-04-01 | End: 2019-04-04

## 2019-04-01 RX ORDER — GLYCOPYRROLATE 1 MG/5 ML
SYRINGE (ML) INTRAVENOUS PRN
Status: DISCONTINUED | OUTPATIENT
Start: 2019-04-01 | End: 2019-04-01 | Stop reason: SDUPTHER

## 2019-04-01 RX ORDER — CEFAZOLIN SODIUM 2 G/50ML
2 SOLUTION INTRAVENOUS ONCE
Status: COMPLETED | OUTPATIENT
Start: 2019-04-01 | End: 2019-04-01

## 2019-04-01 RX ORDER — SODIUM BICARBONATE 42 MG/ML
INJECTION, SOLUTION INTRAVENOUS PRN
Status: DISCONTINUED | OUTPATIENT
Start: 2019-04-01 | End: 2019-04-01 | Stop reason: ALTCHOICE

## 2019-04-01 RX ORDER — FENTANYL CITRATE 50 UG/ML
INJECTION, SOLUTION INTRAMUSCULAR; INTRAVENOUS PRN
Status: DISCONTINUED | OUTPATIENT
Start: 2019-04-01 | End: 2019-04-01 | Stop reason: SDUPTHER

## 2019-04-01 RX ORDER — ONDANSETRON 2 MG/ML
INJECTION INTRAMUSCULAR; INTRAVENOUS PRN
Status: DISCONTINUED | OUTPATIENT
Start: 2019-04-01 | End: 2019-04-01 | Stop reason: SDUPTHER

## 2019-04-01 RX ADMIN — CEFAZOLIN SODIUM 2 G: 2 SOLUTION INTRAVENOUS at 07:27

## 2019-04-01 RX ADMIN — MIDAZOLAM HYDROCHLORIDE 2 MG: 1 INJECTION, SOLUTION INTRAMUSCULAR; INTRAVENOUS at 07:26

## 2019-04-01 RX ADMIN — DEXAMETHASONE SODIUM PHOSPHATE 10 MG: 10 INJECTION INTRAMUSCULAR; INTRAVENOUS at 07:33

## 2019-04-01 RX ADMIN — PROPOFOL 60 MG: 10 INJECTION, EMULSION INTRAVENOUS at 07:31

## 2019-04-01 RX ADMIN — FENTANYL CITRATE 25 MCG: 50 INJECTION, SOLUTION INTRAMUSCULAR; INTRAVENOUS at 07:38

## 2019-04-01 RX ADMIN — Medication 0.2 MG: at 07:47

## 2019-04-01 RX ADMIN — SODIUM CHLORIDE, POTASSIUM CHLORIDE, SODIUM LACTATE AND CALCIUM CHLORIDE: 600; 310; 30; 20 INJECTION, SOLUTION INTRAVENOUS at 06:41

## 2019-04-01 RX ADMIN — ONDANSETRON 4 MG: 2 INJECTION INTRAMUSCULAR; INTRAVENOUS at 07:33

## 2019-04-01 RX ADMIN — SODIUM CHLORIDE, POTASSIUM CHLORIDE, SODIUM LACTATE AND CALCIUM CHLORIDE 1000 ML: 600; 310; 30; 20 INJECTION, SOLUTION INTRAVENOUS at 08:43

## 2019-04-01 RX ADMIN — PROPOFOL 120 MCG/KG/MIN: 10 INJECTION, EMULSION INTRAVENOUS at 07:31

## 2019-04-01 RX ADMIN — FENTANYL CITRATE 25 MCG: 50 INJECTION, SOLUTION INTRAMUSCULAR; INTRAVENOUS at 07:36

## 2019-04-01 RX ADMIN — LIDOCAINE HYDROCHLORIDE 40 MG: 20 INJECTION, SOLUTION INFILTRATION; PERINEURAL at 07:31

## 2019-04-01 RX ADMIN — FENTANYL CITRATE 25 MCG: 50 INJECTION, SOLUTION INTRAMUSCULAR; INTRAVENOUS at 07:39

## 2019-04-01 RX ADMIN — FENTANYL CITRATE 25 MCG: 50 INJECTION, SOLUTION INTRAMUSCULAR; INTRAVENOUS at 07:57

## 2019-04-01 ASSESSMENT — PULMONARY FUNCTION TESTS
PIF_VALUE: 1
PIF_VALUE: 0
PIF_VALUE: 1
PIF_VALUE: 0
PIF_VALUE: 1
PIF_VALUE: 0
PIF_VALUE: 1

## 2019-04-01 NOTE — PROGRESS NOTES
Fingers of left hand dusky, warm,to touch and sluggish to cristina. Loosened left hand ace wrap and dressings and color of fingers improved to pink in color and pt said it felt better. No drainage noted. Movement of left fingers is present. Ice has been maintained to left ramsey area.

## 2019-04-01 NOTE — OP NOTE
Orthopaedic Surgery Operative Note (CSN: [id number])  Date of Surgery: 4/1/2019  Admit Date: 4/1/2019      Preoperative diagnosis: Left carpal tunnel syndrome    Post operative diagnosis: Same    Procedure: Left open carpal tunnel release    Surgeon: Caridad Velasoc. Ravinder Freeman MD    Assistant: Magnolia Love PA-C    Anesthesia: MAC anesthesia with local median nerve block at the wrist    Complications: None known    Estimated blood loss: Less than 5 mL    Specimen: None    Indications for Surgery:  Lavell Giraldo is a 37 y.o. female with clinical complaints consistent with left carpal tunnel syndrome. Conservative therapy was attempted, including wrist splinting, NSAIDs, and activity modification, which ultimately failed to control the symptoms. Operative intervention was recommended. The patient was informed of the risks, benefits, alternatives, and complications of the procedure including but not limited to risk of infection, bleeding, injury to nerves and surrounding structures, and anesthesia risks. The possibility of persistent pain, numbness, or motor dysfunction, wound problems, and need for revision surgery was also discussed. The patient indicated an understanding of these risks and consented to the procedure. Surgical Technique: The patient was identified in the preoperative holding area. I placed my initials on the operative wrist with indelible marker. They were brought to the operating room and transferred supine to the operating table. MAC anesthetic was administered by the anesthesiologist.  The left upper extremity was prepped and draped in the usual sterile fashion. Preoperative timeout was called and the correct patient, correct procedure, correct operative site, and correct surgeon were confirmed by all present. 8 mL of 1% lidocaine without epinephrine was used for a local median nerve block at the wrist and along the planned incision.  The arm was exsanguinated with an Esmarch wrap and the tourniquet was inflated to 250 mm Hg. A longitudinal incision was made in the palm along the radial aspect of the fourth ray from Quinteros's cardinal line to 1 cm distal to the wrist crease. Skin was incised sharply with a scalpel and then dissection was carried out to the level of the transverse carpal ligament. Bleeding was controlled with electrocautery. No anatomic abnormalities or variations were noted. No crossing nerves were encountered. A scalpel was used to incise the transverse carpal ligament and a [place] elevator was placed under the ligament to protect the median nerve. The transverse carpal ligament was then incised proximally and distally over the elevator. Blunt Littler scissors were used to dissect the proximal portion of the transverse carpal ligament free from superficial and deep connective tissue. The proximal ligament and deep antebrachial fascia were then released with the scissors taking care to keep the scissor tips pointed superficially. Digital palpation was used to confirm complete release proximally. The median nerve was inspected and no masses were noted within the carpal tunnel. The wound was thoroughly irrigated with normal saline. The skin was closed with interrupted 4-0 nylon vertical mattress sutures. All counts were determined to be correct. Sterile dressing of xeroform gauze, sterile gauze, and kerlix was applied. The dressing was wrapped with an ACE wrap. The patient was transferred back to the hospital stretcher and brought to the PACU in stable condition having tolerated the procedure well. Nader Maguire PA-C was present throughout the procedure and was integral in patient positioning and draping, surgical assisting, and wound closure. Plan: The patient will be discharged as an outpatient once they have adequate pain control. Light use of the operative hand for the first two weeks. Remove the dressing in 3 days and place a band-aid over the incision.  Use a removable wrist brace for activity and while sleeping for 2 weeks. Follow-up in the office in 10-14 days for wound check and suture removal if appropriate.      Tl Biggs   Date: 4/1/2019  Time: 8:09 AM

## 2019-04-01 NOTE — H&P
Orthopaedic Surgery History and Physical Note    No chief complaint on file. History of Present Illness: Cam Tejada is a 37year-old female with left hand pain consistent with carpal tunnel syndrome. Pain is worse with activity and it also wakes her at night. Pain improves with rest and bracing. Physical Exam:    General:  Alert and oriented x 3, NAD, well kempt, and of stated age. Vital Signs:  /69   Pulse 69   Temp 97.4 °F (36.3 °C) (Temporal)   Resp 16   Ht 5' 7\" (1.702 m)   Wt 237 lb (107.5 kg)   SpO2 98%   BMI 37.12 kg/m²  , Body mass index is 37.12 kg/m². HEENT:  Head is normocephalic and atraumatic. Extraocular muscles are grossly intact. Neck:  Supple, no visible swelling. Cardiovascular:  Hemodynamically stable. Respiratory:  No audible wheezing, unlabored respirations. Skin:  Clean and dry, well kempt. Psychiatric:  Good affect, stable, cooperative  Left Hand: Skin intact with negative Tinel's at the carpal tunnel. Positive carpal compression test. Motor and sensory intact in the hand. Fingers warm and well-perfused. Assessment/Plan: 37year-old female with left carpal tunnel syndrome. 1. Operative intervention with carpal tunnel release planned for today. 2. NPO      Past Medical History    Past Medical History:   Diagnosis Date    Anxiety     Arthritis     Asthma     Depression     Headache     History of blood transfusion 09/2016    x 2    Thyroid disease     hypothyroidism       Past Surgical History    Past Surgical History:   Procedure Laterality Date    HYSTERECTOMY  2017    [place] REVISE MEDIAN N/CARPAL TUNNEL SURG Right 11/12/2018    RIGHT WRIST CARPAL TUNNEL RELEASE SUPINE MAC+ LOCAL (PAT ON ADMIT) performed by Raf Vasquez MD at [address] [place] [address],[address] [facility]    Patient has no known allergies.     Medications      Current Facility-Administered Medications   Medication Dose Route Frequency Provider Last Rate Last Dose    lactated

## 2019-04-01 NOTE — ANESTHESIA POSTPROCEDURE EVALUATION
Department of Anesthesiology  Postprocedure Note    Patient: John Ferrer  MRN: [id number]  YOB: 1980  Date of evaluation: 4/1/2019  Time:  3:02 PM     Procedure Summary     Date:  04/01/19 Room / Location:  [place] [address] / [facility] OR    Anesthesia Start:  0727 Anesthesia Stop:  Teodora Clary    Procedure:  LEFT WRIST CARPAL TUNNEL RELEASE SUPINE MAC + LOCAL (Left ) Diagnosis:  (LEFT WRIST CARPAL TUNNEL SYNDROME)    Surgeon:  Bernie Amor MD Responsible Provider:  Jes Lopez DO    Anesthesia Type:  MAC ASA Status:  3          Anesthesia Type: MAC    Clarice Phase I: Clarice Score: 10    Clarice Phase II: Clarice Score: 10    Last vitals: Reviewed and per EMR flowsheets.        Anesthesia Post Evaluation    Patient location during evaluation: PACU  Patient participation: waiting for patient participation  Level of consciousness: sleepy but conscious  Pain score: 0  Airway patency: patent  Nausea & Vomiting: no nausea and no vomiting  Complications: no  Cardiovascular status: blood pressure returned to baseline and hemodynamically stable  Respiratory status: acceptable  Hydration status: euvolemic

## 2019-06-25 ENCOUNTER — HOSPITAL ENCOUNTER (OUTPATIENT)
Dept: ORTHOPEDIC SURGERY | Age: 39
Discharge: HOME | End: 2019-06-27
Payer: COMMERCIAL

## 2019-06-25 DIAGNOSIS — M75.80 ROTATOR CUFF TENDINITIS, UNSPECIFIED LATERALITY: ICD-10-CM

## 2019-06-25 PROCEDURE — 73030 X-RAY EXAM OF SHOULDER: CPT

## 2020-09-29 ENCOUNTER — HOSPITAL ENCOUNTER (OUTPATIENT)
Dept: MRI IMAGING | Age: 40
Discharge: HOME | End: 2020-10-01
Payer: COMMERCIAL

## 2020-09-29 PROCEDURE — 73718 MRI LOWER EXTREMITY W/O DYE: CPT

## 2020-11-17 NOTE — PROGRESS NOTES
appropriate    Assessment: Body structures, Functions, Activity limitations: Decreased functional mobility , Decreased ADL status, Decreased ROM, Decreased strength  Assessment: Pt w/ slow movements and table mobs this date d/t inc pain from L UT to the ant shldr; Pt demos cont dec in B hip ext strength, demo's good improvement if Cx AROM w/ \"pulling\" in L Ut w/ R SB and rot and dec pain w/ ant-sup glides of L upper thoracic glides;; Initiated IDN w/ good hua to dec pain in L UT. Treatment Diagnosis: LBP;  Neck Pain; Impaired ROM, Impaired strength     Patient Education: PT recomended pt seek orhtopedic consult re increasing pain in the ant shoulder complex ; HOLD scap retractions and TB chest pulls d/t inc pain    Goals:       Long term goals  Time Frame for Long term goals : 10-15 visits  Long term goal 1: Dec LB, R LE  and Neck Pain to </= 4/10 at worse  Long term goal 2: Inc Cx AROM Rotation to >/= 60 deg bilaterally to improve hua to ADL's and driving  Long term goal 3: Inc strength B hip ext to >/= 4+/5 to improve stairs  Long term goal 4: Pt to demo pelvic and sacral symmetry for >/= 2 wks to allow proper lumbo-pelvic mechics during job duties  Long term goal 5: Dec LEÓN to </= 26/50 and NDI to </=17/50  Progress toward goals: met Cx AROM goal however still w/ some pain in L UT    POST-PAIN       Pain Rating (0-10 pain scale):  6 /10 L UT and shoulder  Location and pain description same as pre-treatment unless indicated. Action: [] NA   [x] Perform HEP  [] Meds as prescribed  [] Modalities as prescribed   [] Call Physician     Frequency/Duration:  Plan  Times per week: 2-3  Plan weeks: 5  Current Treatment Recommendations: Strengthening, ROM, Neuromuscular Re-education, Manual Therapy - Soft Tissue Mobilization, Manual Therapy - Joint Manipulation, Pain Management, Modalities, Home Exercise Program, Aquatics, Integrated Dry Needling (pt has home TENS)     Pt to continue current HEP.   See objective
denies

## 2024-01-15 NOTE — PROGRESS NOTES
[id number] [address]  Outpatient Physical Therapy    Treatment Note        Date: 2018  Patient: Benjamín Burdick  : 1980  ACCT #: [de-identified]  Referring Practitioner: Mavis Xiong MD  Diagnosis: Neck Pain;  Back Pain    Visit Information:  PT Visit Information  Onset Date: 18  PT Insurance Information: Generic commercial   Total # of Visits to Date: 3  No Show: 0  Canceled Appointment: 0  Progress Note Counter: 3/10-15    Subjective: Today LB is better - the exercises are helping , but the neck is more painful; LBP 2/; Denies L LE radicular sxs for 3 days and [facility] day [facility] [place] has kept up w/ Chapito snowden   Comments: RTD in a month; Pt has home TENS   HEP Compliance:  [x] Good [] Fair [] Poor [] Reports not doing due to:    Vital Signs  Patient Currently in Pain: Yes   Pain Screening  Patient Currently in Pain: Yes  Pain Assessment  Pain Assessment: 0-10  Pain Level: 6  Pain Type: Chronic pain  Pain Location: Neck  Pain Orientation: Left  Pain Descriptors: Throbbing    OBJECTIVE:   Exercises  Exercise 1: Sacral self correct 2( 3 x 10 L then R)   Exercise 8: Supine chin nods 5s  x 15  Exercise 9: Nod w/ rotation in pain free range C-R-C-L-C x 10  Exercise 10: Supine TB chest pulls IR/ ER YTB x 10  Exercise 11:  Cx AROM in PAIN FREE RANGES  x 10 ea  ( after Mulligan )  Exercise 12: Scap retraction in supine 5 s x 10  Exercise 13: TB Rows/ Lats RTB x 10 ea                          Manual:   Manual therapy  Joint mobilization: NAGS to cx SPS C2-5; SNAG to L C2 w/ R otation x 5   Muscle energy: L on L sacral torsion noted  Soft Tissue Mobalization: STM suboccipitals, Cx paraspinals, UTs, SCMs,; Total manual 12 mins    Modalities:        *Indicates exercise, modality, or manual techniques to be initiated when appropriate    Assessment:         Body structures, Functions, Activity limitations: Decreased functional mobility , Decreased ADL status, Decreased ROM, Decreased show

## (undated) DEVICE — STERILE POLYISOPRENE POWDER-FREE SURGICAL GLOVES: Brand: PROTEXIS

## (undated) DEVICE — PADDING CAST W4INXL4YD HIGHLY ABSRB THAN COT EZ APPL

## (undated) DEVICE — SPONGE GZ W4XL4IN COT 12 PLY TYP VII WVN C FLD DSGN

## (undated) DEVICE — CHLORAPREP 26ML ORANGE

## (undated) DEVICE — 1010 S-DRAPE TOWEL DRAPE 10/BX: Brand: STERI-DRAPE™

## (undated) DEVICE — ZIMMER® STERILE DISPOSABLE TOURNIQUET CUFF, DUAL PORT, SINGLE BLADDER, 18 IN. (46 CM)

## (undated) DEVICE — OCCLUSIVE GAUZE STRIP OVERWRAP,3% BISMUTH TRIBROMOPHENATE IN PETROLATUM BLEND: Brand: XEROFORM

## (undated) DEVICE — HAND II: Brand: MEDLINE INDUSTRIES, INC.

## (undated) DEVICE — BANDAGE,GAUZE,CONFORMING,3"X75",STRL,LF: Brand: MEDLINE

## (undated) DEVICE — DRESSING GZ W1XL8IN COT XRFRM N ADH OVERWRAP CURAD

## (undated) DEVICE — ELECTRODE NDL 2.8IN COAT VALLEYLAB

## (undated) DEVICE — GLOVE ORANGE PI 8   MSG9080

## (undated) DEVICE — ELECTROSURGICAL PENCIL BUTTON SWITCH E-Z CLEAN COATED BLADE ELECTRODE 10 FT (3 M) CORD HOLSTER: Brand: MEGADYNE

## (undated) DEVICE — SYRINGE IRRIG 60ML SFT PLIABLE BLB EZ TO GRP 1 HND USE W/

## (undated) DEVICE — GLOVE SURG SZ 7 L12IN FNGR THK94MIL TRNSLUC YEL LTX HYDRGEL

## (undated) DEVICE — BANDAGE COMPR W3INXL5YD HI E BGE W/ CLP SURE-WRAP

## (undated) DEVICE — DRESSING,GAUZE,PETROLATUM,CURAD,1"X8",ST: Brand: CURAD

## (undated) DEVICE — SUTURE ETHLN SZ 4-0 L18IN NONABSORBABLE BLK L19MM PS-2 3/8 1667H

## (undated) DEVICE — GLOVE SURG SZ 65 THK91MIL LTX FREE SYN POLYISOPRENE

## (undated) DEVICE — GOWN,AURORA,NONREINFORCED,LARGE: Brand: MEDLINE